# Patient Record
Sex: FEMALE | Race: WHITE | NOT HISPANIC OR LATINO | Employment: UNEMPLOYED | URBAN - METROPOLITAN AREA
[De-identification: names, ages, dates, MRNs, and addresses within clinical notes are randomized per-mention and may not be internally consistent; named-entity substitution may affect disease eponyms.]

---

## 2024-07-18 ENCOUNTER — OFFICE VISIT (OUTPATIENT)
Dept: PULMONOLOGY | Facility: CLINIC | Age: 24
End: 2024-07-18
Payer: MEDICAID

## 2024-07-18 VITALS
RESPIRATION RATE: 16 BRPM | OXYGEN SATURATION: 100 % | HEART RATE: 74 BPM | SYSTOLIC BLOOD PRESSURE: 136 MMHG | HEIGHT: 63 IN | TEMPERATURE: 97.6 F | WEIGHT: 178.6 LBS | DIASTOLIC BLOOD PRESSURE: 82 MMHG | BODY MASS INDEX: 31.64 KG/M2

## 2024-07-18 DIAGNOSIS — J30.2 SEASONAL ALLERGIES: ICD-10-CM

## 2024-07-18 DIAGNOSIS — R06.2 WHEEZING: ICD-10-CM

## 2024-07-18 DIAGNOSIS — R05.3 CHRONIC COUGH: ICD-10-CM

## 2024-07-18 DIAGNOSIS — R06.09 DOE (DYSPNEA ON EXERTION): ICD-10-CM

## 2024-07-18 DIAGNOSIS — J45.20 MILD INTERMITTENT ASTHMA WITHOUT COMPLICATION: Primary | ICD-10-CM

## 2024-07-18 LAB — EXHALED NITROUS OXIDE: 9

## 2024-07-18 RX ORDER — FLUTICASONE PROPIONATE AND SALMETEROL XINAFOATE 115; 21 UG/1; UG/1
2 AEROSOL, METERED RESPIRATORY (INHALATION)
Qty: 1 EACH | Refills: 2 | Status: SHIPPED | OUTPATIENT
Start: 2024-07-18

## 2024-07-18 RX ORDER — AMITRIPTYLINE HYDROCHLORIDE 10 MG/1
TABLET, FILM COATED ORAL
COMMUNITY
Start: 2024-03-11

## 2024-07-18 RX ORDER — LAMOTRIGINE 200 MG/1
TABLET ORAL
COMMUNITY

## 2024-07-18 RX ORDER — MONTELUKAST SODIUM 10 MG/1
10 TABLET ORAL NIGHTLY
Qty: 30 TABLET | Refills: 2 | Status: SHIPPED | OUTPATIENT
Start: 2024-07-18

## 2024-07-18 RX ORDER — METOPROLOL SUCCINATE 50 MG/1
50 TABLET, EXTENDED RELEASE ORAL DAILY
COMMUNITY
Start: 2024-06-05 | End: 2024-12-02

## 2024-07-18 RX ORDER — ERGOCALCIFEROL 1.25 MG/1
1 CAPSULE ORAL
COMMUNITY
Start: 2024-02-23

## 2024-07-18 RX ORDER — HYDROXYZINE HYDROCHLORIDE 10 MG/1
10 TABLET, FILM COATED ORAL
COMMUNITY

## 2024-07-18 RX ORDER — CETIRIZINE HYDROCHLORIDE 10 MG/1
TABLET ORAL
COMMUNITY

## 2024-07-18 RX ORDER — BETAMETHASONE DIPROPIONATE 0.5 MG/G
CREAM TOPICAL EVERY 12 HOURS SCHEDULED
COMMUNITY

## 2024-07-18 RX ORDER — ALBUTEROL SULFATE 90 UG/1
2 AEROSOL, METERED RESPIRATORY (INHALATION)
COMMUNITY
Start: 2024-05-23

## 2024-07-18 RX ORDER — FLUTICASONE PROPIONATE 50 MCG
1 SPRAY, SUSPENSION (ML) NASAL DAILY
COMMUNITY
Start: 2024-06-05 | End: 2025-06-05

## 2024-07-18 RX ORDER — PREDNISONE 20 MG/1
TABLET ORAL
COMMUNITY
Start: 2024-05-23 | End: 2024-07-18

## 2024-07-18 RX ORDER — CLOTRIMAZOLE AND BETAMETHASONE DIPROPIONATE 10; .64 MG/G; MG/G
CREAM TOPICAL
COMMUNITY
Start: 2024-06-05 | End: 2025-06-05

## 2024-07-18 RX ORDER — LAMOTRIGINE 25 MG/1
100 TABLET ORAL DAILY
COMMUNITY

## 2024-07-18 RX ORDER — RISPERIDONE 2 MG/1
TABLET ORAL DAILY
COMMUNITY

## 2024-08-09 ENCOUNTER — APPOINTMENT (OUTPATIENT)
Dept: CT IMAGING | Facility: HOSPITAL | Age: 24
End: 2024-08-09
Payer: MEDICAID

## 2024-08-09 ENCOUNTER — HOSPITAL ENCOUNTER (EMERGENCY)
Facility: HOSPITAL | Age: 24
Discharge: HOME | End: 2024-08-09
Attending: EMERGENCY MEDICINE
Payer: MEDICAID

## 2024-08-09 ENCOUNTER — APPOINTMENT (OUTPATIENT)
Dept: ULTRASOUND IMAGING | Facility: HOSPITAL | Age: 24
End: 2024-08-09
Payer: MEDICAID

## 2024-08-09 VITALS
WEIGHT: 176.59 LBS | OXYGEN SATURATION: 100 % | RESPIRATION RATE: 18 BRPM | SYSTOLIC BLOOD PRESSURE: 149 MMHG | HEART RATE: 84 BPM | BODY MASS INDEX: 31.29 KG/M2 | HEIGHT: 63 IN | DIASTOLIC BLOOD PRESSURE: 96 MMHG | TEMPERATURE: 98.3 F

## 2024-08-09 DIAGNOSIS — N83.201 RIGHT OVARIAN CYST: Primary | ICD-10-CM

## 2024-08-09 LAB
ALBUMIN SERPL-MCNC: 4.4 G/DL (ref 3.5–5.2)
ALBUMIN/GLOB SERPL: 1.6 G/DL
ALP SERPL-CCNC: 80 U/L (ref 39–117)
ALT SERPL W P-5'-P-CCNC: 9 U/L (ref 1–33)
ANION GAP SERPL CALCULATED.3IONS-SCNC: 12 MMOL/L (ref 5–15)
AST SERPL-CCNC: 12 U/L (ref 1–32)
BASOPHILS # BLD AUTO: 0.03 10*3/MM3 (ref 0–0.2)
BASOPHILS NFR BLD AUTO: 0.4 % (ref 0–1.5)
BILIRUB SERPL-MCNC: 0.3 MG/DL (ref 0–1.2)
BILIRUB UR QL STRIP: NEGATIVE
BUN SERPL-MCNC: 13 MG/DL (ref 6–20)
BUN/CREAT SERPL: 16.9 (ref 7–25)
CALCIUM SPEC-SCNC: 9.4 MG/DL (ref 8.6–10.5)
CHLORIDE SERPL-SCNC: 103 MMOL/L (ref 98–107)
CLARITY UR: CLEAR
CO2 SERPL-SCNC: 24 MMOL/L (ref 22–29)
COLOR UR: YELLOW
CREAT SERPL-MCNC: 0.77 MG/DL (ref 0.57–1)
DEPRECATED RDW RBC AUTO: 39.9 FL (ref 37–54)
EGFRCR SERPLBLD CKD-EPI 2021: 110.6 ML/MIN/1.73
EOSINOPHIL # BLD AUTO: 0.05 10*3/MM3 (ref 0–0.4)
EOSINOPHIL NFR BLD AUTO: 0.6 % (ref 0.3–6.2)
ERYTHROCYTE [DISTWIDTH] IN BLOOD BY AUTOMATED COUNT: 12.5 % (ref 12.3–15.4)
GLOBULIN UR ELPH-MCNC: 2.7 GM/DL
GLUCOSE SERPL-MCNC: 88 MG/DL (ref 65–99)
GLUCOSE UR STRIP-MCNC: NEGATIVE MG/DL
HCG INTACT+B SERPL-ACNC: <0.5 MIU/ML
HCT VFR BLD AUTO: 39.6 % (ref 34–46.6)
HGB BLD-MCNC: 13.6 G/DL (ref 12–15.9)
HGB UR QL STRIP.AUTO: NEGATIVE
HOLD SPECIMEN: NORMAL
HOLD SPECIMEN: NORMAL
IMM GRANULOCYTES # BLD AUTO: 0.01 10*3/MM3 (ref 0–0.05)
IMM GRANULOCYTES NFR BLD AUTO: 0.1 % (ref 0–0.5)
KETONES UR QL STRIP: NEGATIVE
LEUKOCYTE ESTERASE UR QL STRIP.AUTO: NEGATIVE
LIPASE SERPL-CCNC: 45 U/L (ref 13–60)
LYMPHOCYTES # BLD AUTO: 2.3 10*3/MM3 (ref 0.7–3.1)
LYMPHOCYTES NFR BLD AUTO: 28.2 % (ref 19.6–45.3)
MCH RBC QN AUTO: 30 PG (ref 26.6–33)
MCHC RBC AUTO-ENTMCNC: 34.3 G/DL (ref 31.5–35.7)
MCV RBC AUTO: 87.2 FL (ref 79–97)
MONOCYTES # BLD AUTO: 0.61 10*3/MM3 (ref 0.1–0.9)
MONOCYTES NFR BLD AUTO: 7.5 % (ref 5–12)
NEUTROPHILS NFR BLD AUTO: 5.17 10*3/MM3 (ref 1.7–7)
NEUTROPHILS NFR BLD AUTO: 63.2 % (ref 42.7–76)
NITRITE UR QL STRIP: NEGATIVE
NRBC BLD AUTO-RTO: 0 /100 WBC (ref 0–0.2)
PH UR STRIP.AUTO: 6 [PH] (ref 5–8)
PLATELET # BLD AUTO: 227 10*3/MM3 (ref 140–450)
PMV BLD AUTO: 11 FL (ref 6–12)
POTASSIUM SERPL-SCNC: 4.2 MMOL/L (ref 3.5–5.2)
PROT SERPL-MCNC: 7.1 G/DL (ref 6–8.5)
PROT UR QL STRIP: NEGATIVE
RBC # BLD AUTO: 4.54 10*6/MM3 (ref 3.77–5.28)
SODIUM SERPL-SCNC: 139 MMOL/L (ref 136–145)
SP GR UR STRIP: 1.01 (ref 1–1.03)
UROBILINOGEN UR QL STRIP: NORMAL
WBC NRBC COR # BLD AUTO: 8.17 10*3/MM3 (ref 3.4–10.8)
WHOLE BLOOD HOLD COAG: NORMAL
WHOLE BLOOD HOLD SPECIMEN: NORMAL

## 2024-08-09 PROCEDURE — 76830 TRANSVAGINAL US NON-OB: CPT

## 2024-08-09 PROCEDURE — 25510000001 IOPAMIDOL PER 1 ML: Performed by: EMERGENCY MEDICINE

## 2024-08-09 PROCEDURE — 99285 EMERGENCY DEPT VISIT HI MDM: CPT

## 2024-08-09 PROCEDURE — 85025 COMPLETE CBC W/AUTO DIFF WBC: CPT | Performed by: EMERGENCY MEDICINE

## 2024-08-09 PROCEDURE — 25010000002 KETOROLAC TROMETHAMINE PER 15 MG

## 2024-08-09 PROCEDURE — 83690 ASSAY OF LIPASE: CPT | Performed by: EMERGENCY MEDICINE

## 2024-08-09 PROCEDURE — 74177 CT ABD & PELVIS W/CONTRAST: CPT

## 2024-08-09 PROCEDURE — 80053 COMPREHEN METABOLIC PANEL: CPT | Performed by: EMERGENCY MEDICINE

## 2024-08-09 PROCEDURE — 81003 URINALYSIS AUTO W/O SCOPE: CPT | Performed by: EMERGENCY MEDICINE

## 2024-08-09 PROCEDURE — 84702 CHORIONIC GONADOTROPIN TEST: CPT | Performed by: EMERGENCY MEDICINE

## 2024-08-09 PROCEDURE — 96374 THER/PROPH/DIAG INJ IV PUSH: CPT

## 2024-08-09 RX ORDER — KETOROLAC TROMETHAMINE 10 MG/1
10 TABLET, FILM COATED ORAL EVERY 6 HOURS PRN
Qty: 15 TABLET | Refills: 0 | Status: SHIPPED | OUTPATIENT
Start: 2024-08-09

## 2024-08-09 RX ORDER — KETOROLAC TROMETHAMINE 30 MG/ML
30 INJECTION, SOLUTION INTRAMUSCULAR; INTRAVENOUS ONCE
Status: COMPLETED | OUTPATIENT
Start: 2024-08-09 | End: 2024-08-09

## 2024-08-09 RX ORDER — SODIUM CHLORIDE 0.9 % (FLUSH) 0.9 %
10 SYRINGE (ML) INJECTION AS NEEDED
Status: DISCONTINUED | OUTPATIENT
Start: 2024-08-09 | End: 2024-08-10 | Stop reason: HOSPADM

## 2024-08-09 RX ADMIN — IOPAMIDOL 100 ML: 755 INJECTION, SOLUTION INTRAVENOUS at 19:03

## 2024-08-09 RX ADMIN — KETOROLAC TROMETHAMINE 30 MG: 30 INJECTION, SOLUTION INTRAMUSCULAR; INTRAVENOUS at 18:50

## 2024-08-26 ENCOUNTER — OFFICE VISIT (OUTPATIENT)
Dept: PSYCHIATRY | Facility: CLINIC | Age: 24
End: 2024-08-26
Payer: MEDICAID

## 2024-08-26 VITALS
SYSTOLIC BLOOD PRESSURE: 133 MMHG | HEART RATE: 66 BPM | HEIGHT: 63 IN | BODY MASS INDEX: 31.29 KG/M2 | DIASTOLIC BLOOD PRESSURE: 79 MMHG | WEIGHT: 176.6 LBS

## 2024-08-26 DIAGNOSIS — F43.10 COMPLEX POSTTRAUMATIC STRESS DISORDER: Primary | ICD-10-CM

## 2024-08-26 DIAGNOSIS — F33.2 MDD (MAJOR DEPRESSIVE DISORDER), RECURRENT SEVERE, WITHOUT PSYCHOSIS: ICD-10-CM

## 2024-08-26 DIAGNOSIS — F60.3 BORDERLINE PERSONALITY DISORDER: ICD-10-CM

## 2024-08-26 PROCEDURE — 1159F MED LIST DOCD IN RCRD: CPT

## 2024-08-26 PROCEDURE — 90792 PSYCH DIAG EVAL W/MED SRVCS: CPT

## 2024-08-26 PROCEDURE — 1160F RVW MEDS BY RX/DR IN RCRD: CPT

## 2024-08-26 RX ORDER — BETAMETHASONE DIPROPIONATE 0.5 MG/G
CREAM TOPICAL
COMMUNITY
End: 2024-08-26

## 2024-08-27 ENCOUNTER — OFFICE VISIT (OUTPATIENT)
Dept: OBSTETRICS AND GYNECOLOGY | Facility: CLINIC | Age: 24
End: 2024-08-27
Payer: MEDICAID

## 2024-08-27 VITALS
WEIGHT: 172 LBS | DIASTOLIC BLOOD PRESSURE: 80 MMHG | BODY MASS INDEX: 30.48 KG/M2 | SYSTOLIC BLOOD PRESSURE: 108 MMHG | HEIGHT: 63 IN

## 2024-08-27 DIAGNOSIS — N83.201 CYST OF RIGHT OVARY: Primary | ICD-10-CM

## 2024-08-27 PROCEDURE — 99203 OFFICE O/P NEW LOW 30 MIN: CPT | Performed by: OBSTETRICS & GYNECOLOGY

## 2024-08-28 ENCOUNTER — PATIENT ROUNDING (BHMG ONLY) (OUTPATIENT)
Dept: OBSTETRICS AND GYNECOLOGY | Facility: CLINIC | Age: 24
End: 2024-08-28
Payer: MEDICAID

## 2024-08-30 ENCOUNTER — PATIENT ROUNDING (BHMG ONLY) (OUTPATIENT)
Dept: PSYCHIATRY | Facility: CLINIC | Age: 24
End: 2024-08-30
Payer: MEDICAID

## 2024-09-04 ENCOUNTER — HOSPITAL ENCOUNTER (OUTPATIENT)
Dept: RESPIRATORY THERAPY | Facility: HOSPITAL | Age: 24
Discharge: HOME | End: 2024-09-04
Payer: MEDICAID

## 2024-09-04 ENCOUNTER — LAB (OUTPATIENT)
Dept: LAB | Facility: HOSPITAL | Age: 24
End: 2024-09-04
Payer: MEDICAID

## 2024-09-04 DIAGNOSIS — J45.20 MILD INTERMITTENT ASTHMA WITHOUT COMPLICATION: ICD-10-CM

## 2024-09-04 DIAGNOSIS — R06.09 DOE (DYSPNEA ON EXERTION): ICD-10-CM

## 2024-09-04 DIAGNOSIS — J30.2 SEASONAL ALLERGIES: ICD-10-CM

## 2024-09-04 PROCEDURE — 94060 EVALUATION OF WHEEZING: CPT

## 2024-09-04 PROCEDURE — 82785 ASSAY OF IGE: CPT

## 2024-09-04 PROCEDURE — 94726 PLETHYSMOGRAPHY LUNG VOLUMES: CPT

## 2024-09-04 PROCEDURE — 36415 COLL VENOUS BLD VENIPUNCTURE: CPT

## 2024-09-04 PROCEDURE — 94729 DIFFUSING CAPACITY: CPT

## 2024-09-04 RX ORDER — ALBUTEROL SULFATE 0.83 MG/ML
2.5 SOLUTION RESPIRATORY (INHALATION) ONCE
Status: COMPLETED | OUTPATIENT
Start: 2024-09-04 | End: 2024-09-04

## 2024-09-04 RX ADMIN — ALBUTEROL SULFATE 2.5 MG: 2.5 SOLUTION RESPIRATORY (INHALATION) at 11:20

## 2024-09-07 LAB — IGE SERPL-ACNC: 5 IU/ML (ref 6–495)

## 2024-09-18 DIAGNOSIS — R06.2 WHEEZING: ICD-10-CM

## 2024-09-18 DIAGNOSIS — J45.20 MILD INTERMITTENT ASTHMA WITHOUT COMPLICATION: ICD-10-CM

## 2024-09-18 DIAGNOSIS — R06.09 DOE (DYSPNEA ON EXERTION): ICD-10-CM

## 2024-09-18 DIAGNOSIS — J30.2 SEASONAL ALLERGIES: ICD-10-CM

## 2024-09-18 DIAGNOSIS — R05.3 CHRONIC COUGH: Primary | ICD-10-CM

## 2024-09-26 ENCOUNTER — OFFICE VISIT (OUTPATIENT)
Dept: PSYCHIATRY | Facility: CLINIC | Age: 24
End: 2024-09-26
Payer: MEDICAID

## 2024-09-26 VITALS
WEIGHT: 179 LBS | HEIGHT: 63 IN | SYSTOLIC BLOOD PRESSURE: 125 MMHG | BODY MASS INDEX: 31.71 KG/M2 | HEART RATE: 83 BPM | DIASTOLIC BLOOD PRESSURE: 75 MMHG

## 2024-09-26 DIAGNOSIS — F60.3 BORDERLINE PERSONALITY DISORDER: ICD-10-CM

## 2024-09-26 DIAGNOSIS — F43.10 COMPLEX POSTTRAUMATIC STRESS DISORDER: Primary | ICD-10-CM

## 2024-09-26 DIAGNOSIS — F33.2 MDD (MAJOR DEPRESSIVE DISORDER), RECURRENT SEVERE, WITHOUT PSYCHOSIS: ICD-10-CM

## 2024-09-26 DIAGNOSIS — F90.9 ADULT ADHD: ICD-10-CM

## 2024-09-26 RX ORDER — BETAMETHASONE DIPROPIONATE 0.5 MG/G
CREAM TOPICAL EVERY 12 HOURS SCHEDULED
COMMUNITY

## 2024-09-26 RX ORDER — MUPIROCIN 20 MG/G
OINTMENT TOPICAL 3 TIMES DAILY
COMMUNITY
Start: 2024-08-27

## 2024-09-26 RX ORDER — KETOCONAZOLE 20 MG/G
CREAM TOPICAL DAILY
COMMUNITY
Start: 2024-09-12 | End: 2024-09-26

## 2024-10-01 ENCOUNTER — LAB (OUTPATIENT)
Dept: LAB | Facility: HOSPITAL | Age: 24
End: 2024-10-01
Payer: MEDICAID

## 2024-10-01 DIAGNOSIS — F90.9 ADULT ADHD: Primary | ICD-10-CM

## 2024-10-01 DIAGNOSIS — F90.9 ADULT ADHD: ICD-10-CM

## 2024-10-01 LAB
AMPHET+METHAMPHET UR QL: NEGATIVE
AMPHETAMINES UR QL: NEGATIVE
BARBITURATES UR QL SCN: NEGATIVE
BENZODIAZ UR QL SCN: NEGATIVE
BUPRENORPHINE SERPL-MCNC: NEGATIVE NG/ML
CANNABINOIDS SERPL QL: POSITIVE
COCAINE UR QL: NEGATIVE
FENTANYL UR-MCNC: NEGATIVE NG/ML
METHADONE UR QL SCN: NEGATIVE
OPIATES UR QL: NEGATIVE
OXYCODONE UR QL SCN: NEGATIVE
PCP UR QL SCN: NEGATIVE
TRICYCLICS UR QL SCN: NEGATIVE

## 2024-10-01 PROCEDURE — 80307 DRUG TEST PRSMV CHEM ANLYZR: CPT

## 2024-10-01 RX ORDER — DEXTROAMPHETAMINE SACCHARATE, AMPHETAMINE ASPARTATE, DEXTROAMPHETAMINE SULFATE AND AMPHETAMINE SULFATE 1.25; 1.25; 1.25; 1.25 MG/1; MG/1; MG/1; MG/1
5 TABLET ORAL DAILY
Qty: 30 TABLET | Refills: 0 | Status: SHIPPED | OUTPATIENT
Start: 2024-10-01 | End: 2024-10-31

## 2024-10-02 ENCOUNTER — TELEPHONE (OUTPATIENT)
Dept: PSYCHIATRY | Facility: CLINIC | Age: 24
End: 2024-10-02
Payer: MEDICAID

## 2024-10-13 DIAGNOSIS — J30.2 SEASONAL ALLERGIES: ICD-10-CM

## 2024-10-13 DIAGNOSIS — J45.20 MILD INTERMITTENT ASTHMA WITHOUT COMPLICATION: ICD-10-CM

## 2024-10-14 RX ORDER — MONTELUKAST SODIUM 10 MG/1
10 TABLET ORAL NIGHTLY
Qty: 30 TABLET | Refills: 2 | Status: SHIPPED | OUTPATIENT
Start: 2024-10-14

## 2024-10-18 DIAGNOSIS — J30.2 SEASONAL ALLERGIES: ICD-10-CM

## 2024-10-18 DIAGNOSIS — R06.2 WHEEZING: ICD-10-CM

## 2024-10-18 DIAGNOSIS — J45.20 MILD INTERMITTENT ASTHMA WITHOUT COMPLICATION: ICD-10-CM

## 2024-10-18 DIAGNOSIS — R05.3 CHRONIC COUGH: ICD-10-CM

## 2024-10-18 RX ORDER — FLUTICASONE PROPIONATE AND SALMETEROL XINAFOATE 115; 21 UG/1; UG/1
2 AEROSOL, METERED RESPIRATORY (INHALATION) 2 TIMES DAILY
Qty: 12 G | Refills: 11 | Status: SHIPPED | OUTPATIENT
Start: 2024-10-18

## 2024-10-22 DIAGNOSIS — J30.2 SEASONAL ALLERGIES: ICD-10-CM

## 2024-10-22 DIAGNOSIS — R05.3 CHRONIC COUGH: ICD-10-CM

## 2024-10-22 DIAGNOSIS — J30.9 ALLERGIC RHINITIS, UNSPECIFIED SEASONALITY, UNSPECIFIED TRIGGER: Primary | ICD-10-CM

## 2024-10-22 RX ORDER — AZELASTINE 1 MG/ML
2 SPRAY, METERED NASAL 2 TIMES DAILY
Qty: 1 EACH | Refills: 12 | Status: SHIPPED | OUTPATIENT
Start: 2024-10-22

## 2024-10-28 DIAGNOSIS — F90.9 ADULT ADHD: ICD-10-CM

## 2024-10-28 RX ORDER — DEXTROAMPHETAMINE SACCHARATE, AMPHETAMINE ASPARTATE, DEXTROAMPHETAMINE SULFATE AND AMPHETAMINE SULFATE 1.25; 1.25; 1.25; 1.25 MG/1; MG/1; MG/1; MG/1
5 TABLET ORAL DAILY
Qty: 30 TABLET | Refills: 0 | Status: SHIPPED | OUTPATIENT
Start: 2024-10-31 | End: 2024-11-30

## 2024-11-04 RX ORDER — METHACHOLINE CHLORIDE 0.1875/3ML
3 VIAL, NEBULIZER (ML) INHALATION
Status: ACTIVE | OUTPATIENT
Start: 2024-11-11 | End: 2024-11-11

## 2024-11-04 RX ORDER — ALBUTEROL SULFATE 0.83 MG/ML
2.5 SOLUTION RESPIRATORY (INHALATION) ONCE AS NEEDED
Status: DISCONTINUED | OUTPATIENT
Start: 2024-11-11 | End: 2024-11-14 | Stop reason: HOSPADM

## 2024-11-04 RX ORDER — METHACHOLINE CHLORIDE 3 MG/3 ML
3 VIAL, NEBULIZER (ML) INHALATION
Status: ACTIVE | OUTPATIENT
Start: 2024-11-11 | End: 2024-11-11

## 2024-11-04 RX ORDER — METHACHOLINE CHLORIDE 48 MG/3 ML
3 VIAL, NEBULIZER (ML) INHALATION
Status: ACTIVE | OUTPATIENT
Start: 2024-11-11 | End: 2024-11-11

## 2024-11-04 RX ORDER — METHACHOLINE CHLORIDE 0 MG/3 ML
3 VIAL, NEBULIZER (ML) INHALATION
Status: DISPENSED | OUTPATIENT
Start: 2024-11-11 | End: 2024-11-11

## 2024-11-04 RX ORDER — ALBUTEROL SULFATE 0.83 MG/ML
2.5 SOLUTION RESPIRATORY (INHALATION) ONCE
Status: DISCONTINUED | OUTPATIENT
Start: 2024-11-11 | End: 2024-11-14 | Stop reason: HOSPADM

## 2024-11-04 RX ORDER — METHACHOLINE CHLORIDE 12 MG/3 ML
3 VIAL, NEBULIZER (ML) INHALATION
Status: ACTIVE | OUTPATIENT
Start: 2024-11-11 | End: 2024-11-11

## 2024-11-04 RX ORDER — SODIUM CHLORIDE FOR INHALATION 0.9 %
3 VIAL, NEBULIZER (ML) INHALATION ONCE AS NEEDED
Status: DISCONTINUED | OUTPATIENT
Start: 2024-11-11 | End: 2024-11-14 | Stop reason: HOSPADM

## 2024-11-04 RX ORDER — METHACHOLINE CHLORIDE 0.75/3ML
3 VIAL, NEBULIZER (ML) INHALATION
Status: ACTIVE | OUTPATIENT
Start: 2024-11-11 | End: 2024-11-11

## 2024-11-11 ENCOUNTER — HOSPITAL ENCOUNTER (OUTPATIENT)
Dept: RESPIRATORY THERAPY | Facility: HOSPITAL | Age: 24
Discharge: HOME | End: 2024-11-11
Payer: MEDICAID

## 2024-11-14 ENCOUNTER — OFFICE VISIT (OUTPATIENT)
Dept: PSYCHIATRY | Facility: CLINIC | Age: 24
End: 2024-11-14
Payer: MEDICAID

## 2024-11-14 VITALS
SYSTOLIC BLOOD PRESSURE: 118 MMHG | OXYGEN SATURATION: 98 % | BODY MASS INDEX: 30.41 KG/M2 | HEART RATE: 88 BPM | WEIGHT: 171.6 LBS | HEIGHT: 63 IN | DIASTOLIC BLOOD PRESSURE: 71 MMHG

## 2024-11-14 DIAGNOSIS — F90.9 ADULT ADHD: Primary | ICD-10-CM

## 2024-11-14 DIAGNOSIS — F43.10 COMPLEX POSTTRAUMATIC STRESS DISORDER: ICD-10-CM

## 2024-11-14 DIAGNOSIS — F33.2 MDD (MAJOR DEPRESSIVE DISORDER), RECURRENT SEVERE, WITHOUT PSYCHOSIS: ICD-10-CM

## 2024-11-14 DIAGNOSIS — F60.3 BORDERLINE PERSONALITY DISORDER: ICD-10-CM

## 2024-11-14 RX ORDER — KETOCONAZOLE 20 MG/G
CREAM TOPICAL
COMMUNITY
Start: 2024-10-01

## 2024-11-14 RX ORDER — DEXTROAMPHETAMINE SACCHARATE, AMPHETAMINE ASPARTATE, DEXTROAMPHETAMINE SULFATE AND AMPHETAMINE SULFATE 1.25; 1.25; 1.25; 1.25 MG/1; MG/1; MG/1; MG/1
5 TABLET ORAL
Qty: 30 TABLET | Refills: 0 | Status: SHIPPED | OUTPATIENT
Start: 2024-11-14 | End: 2024-12-14

## 2024-11-14 RX ORDER — DEXTROAMPHETAMINE SACCHARATE, AMPHETAMINE ASPARTATE, DEXTROAMPHETAMINE SULFATE AND AMPHETAMINE SULFATE 1.25; 1.25; 1.25; 1.25 MG/1; MG/1; MG/1; MG/1
5 TABLET ORAL
Qty: 30 TABLET | Refills: 0 | Status: SHIPPED | OUTPATIENT
Start: 2024-11-14 | End: 2024-11-14

## 2024-11-14 RX ORDER — IBUPROFEN 600 MG/1
TABLET, FILM COATED ORAL
COMMUNITY
Start: 2024-10-01
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
## 2024-11-15 ENCOUNTER — HOSPITAL ENCOUNTER (OUTPATIENT)
Dept: RESPIRATORY THERAPY | Facility: HOSPITAL | Age: 24
Discharge: HOME | End: 2024-11-15
Payer: MEDICAID

## 2024-11-20 RX ORDER — ALBUTEROL SULFATE 0.83 MG/ML
2.5 SOLUTION RESPIRATORY (INHALATION) ONCE
Status: COMPLETED | OUTPATIENT
Start: 2024-11-25 | End: 2024-11-25

## 2024-11-20 RX ORDER — ALBUTEROL SULFATE 0.83 MG/ML
2.5 SOLUTION RESPIRATORY (INHALATION) ONCE AS NEEDED
Status: DISCONTINUED | OUTPATIENT
Start: 2024-11-25 | End: 2024-11-26 | Stop reason: HOSPADM

## 2024-11-20 RX ORDER — SODIUM CHLORIDE FOR INHALATION 0.9 %
3 VIAL, NEBULIZER (ML) INHALATION ONCE AS NEEDED
Status: COMPLETED | OUTPATIENT
Start: 2024-11-25 | End: 2024-11-25

## 2024-11-20 RX ORDER — METHACHOLINE CHLORIDE 3 MG/3 ML
3 VIAL, NEBULIZER (ML) INHALATION
Status: COMPLETED | OUTPATIENT
Start: 2024-11-25 | End: 2024-11-25

## 2024-11-20 RX ORDER — METHACHOLINE CHLORIDE 48 MG/3 ML
3 VIAL, NEBULIZER (ML) INHALATION
Status: COMPLETED | OUTPATIENT
Start: 2024-11-25 | End: 2024-11-25

## 2024-11-20 RX ORDER — METHACHOLINE CHLORIDE 12 MG/3 ML
3 VIAL, NEBULIZER (ML) INHALATION
Status: COMPLETED | OUTPATIENT
Start: 2024-11-25 | End: 2024-11-25

## 2024-11-20 RX ORDER — METHACHOLINE CHLORIDE 0.1875/3ML
3 VIAL, NEBULIZER (ML) INHALATION
Status: COMPLETED | OUTPATIENT
Start: 2024-11-25 | End: 2024-11-25

## 2024-11-20 RX ORDER — METHACHOLINE CHLORIDE 0.75/3ML
3 VIAL, NEBULIZER (ML) INHALATION
Status: COMPLETED | OUTPATIENT
Start: 2024-11-25 | End: 2024-11-25

## 2024-11-20 RX ORDER — METHACHOLINE CHLORIDE 0 MG/3 ML
3 VIAL, NEBULIZER (ML) INHALATION
Status: DISPENSED | OUTPATIENT
Start: 2024-11-25 | End: 2024-11-25

## 2024-11-21 ENCOUNTER — TELEPHONE (OUTPATIENT)
Dept: PSYCHIATRY | Facility: CLINIC | Age: 24
End: 2024-11-21
Payer: MEDICAID

## 2024-11-21 NOTE — TELEPHONE ENCOUNTER
[facility] to [facility] on [facility]24, called [facility] to follow up on referral order, spoke with linda states that they left message for patient to contact their office to schedule, states patient never returned phone calls, closing out referral order

## 2024-11-25 ENCOUNTER — HOSPITAL ENCOUNTER (OUTPATIENT)
Dept: RESPIRATORY THERAPY | Facility: HOSPITAL | Age: 24
Discharge: HOME | End: 2024-11-25
Admitting: NURSE PRACTITIONER
Payer: MEDICAID

## 2024-11-25 DIAGNOSIS — J30.2 SEASONAL ALLERGIES: ICD-10-CM

## 2024-11-25 DIAGNOSIS — F90.9 ADULT ADHD: ICD-10-CM

## 2024-11-25 DIAGNOSIS — R06.2 WHEEZING: ICD-10-CM

## 2024-11-25 DIAGNOSIS — R06.09 DOE (DYSPNEA ON EXERTION): ICD-10-CM

## 2024-11-25 DIAGNOSIS — J45.20 MILD INTERMITTENT ASTHMA WITHOUT COMPLICATION: ICD-10-CM

## 2024-11-25 DIAGNOSIS — R05.3 CHRONIC COUGH: ICD-10-CM

## 2024-11-25 PROCEDURE — 94070 EVALUATION OF WHEEZING: CPT

## 2024-11-25 RX ORDER — DEXTROAMPHETAMINE SACCHARATE, AMPHETAMINE ASPARTATE, DEXTROAMPHETAMINE SULFATE AND AMPHETAMINE SULFATE 1.25; 1.25; 1.25; 1.25 MG/1; MG/1; MG/1; MG/1
5 TABLET ORAL
Qty: 30 TABLET | Refills: 0 | Status: SHIPPED | OUTPATIENT
Start: 2024-11-27 | End: 2024-11-27 | Stop reason: SDUPTHER

## 2024-11-25 RX ADMIN — Medication 0.75 MG: at 14:00

## 2024-11-25 RX ADMIN — Medication 3 MG: at 14:05

## 2024-11-25 RX ADMIN — Medication 48 MG: at 14:15

## 2024-11-25 RX ADMIN — ALBUTEROL SULFATE 2.5 MG: 2.5 SOLUTION RESPIRATORY (INHALATION) at 14:20

## 2024-11-25 RX ADMIN — Medication 0.19 MG: at 13:55

## 2024-11-25 RX ADMIN — Medication 3 ML: at 13:50

## 2024-11-25 RX ADMIN — Medication 12 MG: at 14:10

## 2024-11-25 NOTE — TELEPHONE ENCOUNTER
PT(PATIENT) VERIFIED     PT(PATIENT) STATES SHE WAS SUPPOSED TO RECEIVE AN INCREASED DOSAGE OF amphetamine-dextroamphetamine (Adderall) 5 MG tablet (2024) BUT [facility] DOESN'T HAVE THE ORDER     PLEASE ADVISE

## 2024-11-27 RX ORDER — DEXTROAMPHETAMINE SACCHARATE, AMPHETAMINE ASPARTATE, DEXTROAMPHETAMINE SULFATE AND AMPHETAMINE SULFATE 1.25; 1.25; 1.25; 1.25 MG/1; MG/1; MG/1; MG/1
5 TABLET ORAL 2 TIMES DAILY
Qty: 60 TABLET | Refills: 0 | Status: SHIPPED | OUTPATIENT
Start: 2024-11-27

## 2024-11-27 NOTE — TELEPHONE ENCOUNTER
"PT PHONED IN ON CALL.    PT REPORTS THAT HER ADDERALL PRESCRIPTION IS SUPPOSED TO BE FOR 5MG TAKE TWICE DAILY.     PER PTS LAST OFFICE VISIT:  Office Visit with Ritu Cleaning, APRN (11/14/2024)   \"Medication regimen: Add IR Adderall 5 mg po q afternoon as booster dose, Continue Adderall IR in the AM;\"     ROUTING TO COVERING PROVIDER.  "

## 2024-12-20 ENCOUNTER — TELEPHONE (OUTPATIENT)
Dept: PULMONOLOGY | Facility: CLINIC | Age: 24
End: 2024-12-20
Payer: MEDICAID

## 2024-12-20 DIAGNOSIS — B37.0 THRUSH: Primary | ICD-10-CM

## 2024-12-20 RX ORDER — NYSTATIN 100000 [USP'U]/ML
500000 SUSPENSION ORAL 4 TIMES DAILY
Qty: 473 ML | Refills: 0 | Status: SHIPPED | OUTPATIENT
Start: 2024-12-20

## 2024-12-20 NOTE — TELEPHONE ENCOUNTER
PATIENT CALLED AND HAS THRUSH AND SHE WOULD REALLY LIKE TO SEE IF YOU WOULD SEND IN A SCRIPT FOR HER.    PLEASE CALL PATIENT AND ADVISE.

## 2024-12-27 DIAGNOSIS — F90.9 ADULT ADHD: ICD-10-CM

## 2024-12-27 RX ORDER — DEXTROAMPHETAMINE SACCHARATE, AMPHETAMINE ASPARTATE, DEXTROAMPHETAMINE SULFATE AND AMPHETAMINE SULFATE 1.25; 1.25; 1.25; 1.25 MG/1; MG/1; MG/1; MG/1
5 TABLET ORAL 2 TIMES DAILY
Qty: 60 TABLET | Refills: 0 | Status: SHIPPED | OUTPATIENT
Start: 2024-12-27

## 2024-12-31 ENCOUNTER — OFFICE VISIT (OUTPATIENT)
Dept: PULMONOLOGY | Facility: CLINIC | Age: 24
End: 2024-12-31
Payer: MEDICAID

## 2024-12-31 VITALS
OXYGEN SATURATION: 98 % | TEMPERATURE: 98.9 F | DIASTOLIC BLOOD PRESSURE: 84 MMHG | BODY MASS INDEX: 29.06 KG/M2 | HEART RATE: 82 BPM | HEIGHT: 63 IN | RESPIRATION RATE: 18 BRPM | SYSTOLIC BLOOD PRESSURE: 126 MMHG | WEIGHT: 164 LBS

## 2024-12-31 DIAGNOSIS — B37.0 THRUSH: ICD-10-CM

## 2024-12-31 DIAGNOSIS — R07.89 CHEST PAIN, ATYPICAL: ICD-10-CM

## 2024-12-31 DIAGNOSIS — J30.2 SEASONAL ALLERGIES: ICD-10-CM

## 2024-12-31 DIAGNOSIS — R06.09 DYSPNEA ON EXERTION: Primary | ICD-10-CM

## 2024-12-31 RX ORDER — FLUCONAZOLE 100 MG/1
100 TABLET ORAL DAILY
Qty: 7 TABLET | Refills: 0 | Status: SHIPPED | OUTPATIENT
Start: 2024-12-31 | End: 2025-01-07

## 2024-12-31 RX ORDER — MONTELUKAST SODIUM 10 MG/1
10 TABLET ORAL NIGHTLY
Qty: 30 TABLET | Refills: 11 | Status: SHIPPED | OUTPATIENT
Start: 2024-12-31

## 2024-12-31 NOTE — PROGRESS NOTES
Primary Care Provider  Ellen Black, FRANCO     Referring Provider  No ref. provider found     Chief Complaint  Asthma, Follow-up (5 Month f/u ), Cough, Shortness of Breath (With heat and humidity ), and Results (Bronchial challenge and PFT )    Subjective          Evelyn Rashid presents to [facility]MON[facility]INE  History of Present Illness  Evelyn Rashid is a 24 y.o. female patient here for management of shortness of breath.    Patient had a methacholine challenge test which was negative for asthma.  She does complain of shortness of breath especially with heat and humidity.  Patient states that showers are difficult for her.  She did discontinue the Advair due to thrush and did not notice a difference after discontinuing this medication.  She has used her albuterol for coughing spells.  She continues to take Singulair at night.  She does describe a discomfort in the center of her chest to her back along with a grinding sensation.  Patient has not had a CT scan of her chest since 2019.  Overall, she is that she is doing okay and has no additional concerns at this time.     Her history of smoking is   Tobacco Use: Low Risk  (12/31/2024)    Patient History     Smoking Tobacco Use: Never     Smokeless Tobacco Use: Never     Passive Exposure: Past   .    Review of Systems   Constitutional:  Negative for chills, fatigue, fever, unexpected weight gain and unexpected weight loss.   HENT:  Congestion: Nasal.    Respiratory:  Positive for cough and shortness of breath. Negative for apnea and wheezing.         Negative for Hemoptysis     Cardiovascular:  Negative for chest pain, palpitations and leg swelling.   Skin:         Negative for cyanosis      Sleep: Negative for Excessive daytime sleepiness  Negative for morning headaches  Negative for Snoring    Family History   Problem Relation Age of Onset    Hypertension Father     ADD / ADHD Father         Unsure if  diagnosed - dont think so - talks about it briefly and how it effects him but i dont think he pursues his mental health since he has been able to become highly successful anyways    OCD Father         Unsure if diagnosed - talked about    Breast cancer - additional onset Mother         ERROR    Depression Mother     Drug abuse Mother         Addiction to benzos, opioids, barbiturates, cocaine    Paranoid behavior Mother         Delusional Parasytosis    Suicide Attempts Mother         Unsure - her death was originally pitched to me as murder, then notated as accidental, then later revealed to possibly be suicide    No Known Problems Brother     No Known Problems Sister     Hypertension Paternal Grandfather          of heart attack    Heart attack Paternal Grandfather     Dementia Paternal Grandmother     Anxiety disorder Maternal Grandmother     OCD Maternal Grandmother     Osteoporosis Maternal Grandmother     Diabetes Maternal Grandfather     Alcohol abuse Maternal Aunt     Bipolar disorder Maternal Aunt     No Known Problems Maternal Uncle     No Known Problems Paternal Aunt     No Known Problems Paternal Uncle     No Known Problems Cousin     No Known Problems Other     Schizophrenia Neg Hx     Seizures Neg Hx     Self-Injurious Behavior  Neg Hx     Breast cancer Neg Hx     Uterine cancer Neg Hx     Ovarian cancer Neg Hx     Colon cancer Neg Hx         Social History     Socioeconomic History    Marital status: Single   Tobacco Use    Smoking status: Never     Passive exposure: Past    Smokeless tobacco: Never    Tobacco comments:     Used very limitedly for like vape 2 months at most and was barely using it - never touched again   Vaping Use    Vaping status: Former    Start date: 2024    Quit date: 2024    Substances: Nicotine, Flavoring    Devices: Disposable   Substance and Sexual Activity    Alcohol use: Not Currently     Comment: I dont drink at all unless socially and it’d be just a glass     Drug use: Yes     Frequency: 7.0 times per week     Types: Marijuana     Comment: Use medically, keep incredibly informed, have my certificate    Sexual activity: Not Currently     Partners: Male     Birth control/protection: Condom     Comment: Long term partner        Past Medical History:   Diagnosis Date    ADD (attention deficit disorder)     Bipolar disorder 2019    This diagnosis is no longer representative of my symptoms and was certainly a misdiagnosis but for records sake, you should know i had been misdiagnosed and medicated for bipolar for a few years    Borderline personality disorder 2021    Symptoms existed prior to a crisis - post crisis there was a severe worsening of behaviors to the point my personality and how i react is different from before that relationship ended    Chronic pain disorder 2012    Fibromyalgia and prob more undiagnosed    Chronic post-traumatic stress disorder (PTSD)     Depression Unsure - 2012    Persistent feelings of hopelessness among other negative feelings present since 2012 age 11    Fibromyalgia     Head injury Childhood    Had head stapled after cracking it in the shower w my mom as a toddler/baby. Hit my head frequently in my youth without ever looking into those occurences other than the initial head trauma    Hypertension     IBS (irritable bowel syndrome)     Migraine     Ovarian cyst     Panic disorder 2012    This was thrown in once by someone without further looking into it. I am currently struggling w either panic or anxiety attacks but i predominantly have meltdowns and the distinction hasnt been made yet by my providers    Personality disorder     PMS (premenstrual syndrome)     Self-injurious behavior 2016    Previous meltdowns have before involved wanting to self soothe with self harm or using it as a tool to calm down during a crisis and have control. The main trigger of the PSTD was in [id number] and was when the self harm became real    Suicide attempt  2017    Trauma     Urinary tract infection         Immunization History   Administered Date(s) Administered    DTaP 01/03/2000, 03/09/2000, 05/19/2000, 06/01/2001, 02/21/2005    Fluzone (or Fluarix & Flulaval for VFC) >6mos 11/09/2018, 12/13/2022    H1N1 All Forms 11/11/2009    HPV Quadrivalent 05/25/2011, 07/27/2011, 01/05/2012    Hep A, Unspecified 07/13/2006, 07/13/2007    Hep B, Unspecified 1999, 1999, 09/07/2000    HiB 01/03/2000, 03/09/2000, 05/19/2000, 06/01/2001    IPV 01/03/2000, 03/09/2000, 06/01/2001, 02/21/2005    Influenza Seasonal Injectable 10/16/2013, 09/17/2014    MCV4 Unspecified 01/05/2012    MMR 01/10/2001, 02/21/2005    Meningococcal Conjugate 05/17/2017    PEDS-Pneumococcal Conjugate (PCV7) 05/19/2000, 08/02/2000, 10/10/2000, 06/01/2001    Tdap 05/25/2011    Varicella 01/10/2001, 07/13/2007         Allergies   Allergen Reactions    Sulfamethoxazole-Trimethoprim Anaphylaxis, Other (See Comments) and Shortness Of Breath     And delerious    Aspergillus Species Other (See Comments)     PT REPORTS THAT SHE WAS INFORMED BY ALLERGIST     [place] Other (See Comments)     PT REPORTS THAT SHE WAS INFORMED BY ALLERGIST     Cat Hair Extract Other (See Comments)     PT REPORTS THAT SHE WAS INFORMED BY ALLERGIST     Dog Fennel Other (See Comments)     PT REPORTS THAT SHE WAS INFORMED BY ALLERGIST     Dust Mite Extract Other (See Comments)     PT REPORTS THAT SHE WAS INFORMED BY ALLERGIST     [place] Other (See Comments)     PT REPORTS THAT SHE WAS INFORMED BY ALLERGIST       [place] Pollen Extract [Gramineae Pollens] Other (See Comments)     PT REPORTS THAT SHE WAS INFORMED BY ALLERGIST     Mixed Ragweed Other (See Comments)     PT REPORTS THAT SHE WAS INFORMED BY ALLERGIST     [place] Other (See Comments)     PT REPORTS THAT SHE WAS INFORMED BY ALLERGIST     Adhesive Tape Dermatitis and Hives     Skin peals off, blisters    Oak Bark [Quercus Robur] Rash     PT REPORTS  THAT SHE WAS INFORMED BY ALLERGIST           Current Outpatient Medications:     albuterol sulfate  (90 Base) MCG/ACT inhaler, Inhale 2 puffs., Disp: , Rfl:     amphetamine-dextroamphetamine (Adderall) 5 MG tablet, Take 1 tablet by mouth 2 (Two) Times a Day., Disp: 60 tablet, Rfl: 0    azelastine (ASTELIN) 0.1 % nasal spray, Administer 2 sprays into the nostril(s) as directed by provider 2 (Two) Times a Day. Use in each nostril as directed, Disp: 1 each, Rfl: 12    cetirizine (ZyrTEC Allergy) 10 MG tablet, , Disp: , Rfl:     fluticasone (FLONASE) 50 MCG/ACT nasal spray, Administer 1 spray into the nostril(s) as directed by provider Daily., Disp: , Rfl:     metoprolol succinate XL (TOPROL-XL) 50 MG 24 hr tablet, Take 1 tablet by mouth Daily., Disp: , Rfl:     montelukast (SINGULAIR) 10 MG tablet, Take 1 tablet by mouth Every Night., Disp: 30 tablet, Rfl: 11    nystatin (MYCOSTATIN) 100,000 unit/mL suspension, Take 5 mL by mouth 4 (Four) Times a Day., Disp: 473 mL, Rfl: 0    fluconazole (DIFLUCAN) 100 MG tablet, Take 1 tablet by mouth Daily for 7 days., Disp: 7 tablet, Rfl: 0    ibuprofen (ADVIL,MOTRIN) 600 MG tablet, TAKE 1 TABLET BY MOUTH EVERY 8 HOURS FOR UP TO 10 DAYS AS NEEDED FOR PAIN (Patient not taking: Reported on 12/31/2024), Disp: , Rfl:     ketoconazole (NIZORAL) 2 % cream, APPLY TOPICALLY TO AFFECTED AREA TWICE DAILY FOR 14 DAYS (Patient not taking: Reported on 12/31/2024), Disp: , Rfl:     ketorolac (TORADOL) 10 MG tablet, Take 1 tablet by mouth Every 6 (Six) Hours As Needed for Moderate Pain. (Patient not taking: Reported on 12/31/2024), Disp: 15 tablet, Rfl: 0    mupirocin (BACTROBAN) 2 % ointment, Apply  topically to the appropriate area as directed 3 (Three) Times a Day. apply topically to the affected area three times daily (Patient not taking: Reported on 12/31/2024), Disp: , Rfl:      Objective   Physical Exam  Constitutional:       General: She is not in acute distress.     Appearance:  "Normal appearance. She is normal weight.   HENT:      Right Ear: Hearing normal.      Left Ear: Hearing normal.      Nose: No nasal tenderness or congestion.      Mouth/Throat:      Mouth: Mucous membranes are moist. No oral lesions.   Eyes:      Extraocular Movements: Extraocular movements intact.      Pupils: Pupils are equal, round, and reactive to light.   Cardiovascular:      Rate and Rhythm: Normal rate and regular rhythm.      Pulses: Normal pulses.      Heart sounds: Normal heart sounds. No murmur heard.  Pulmonary:      Effort: Pulmonary effort is normal.      Breath sounds: Normal breath sounds. No wheezing, rhonchi or rales.   Musculoskeletal:      Right lower leg: No edema.      Left lower leg: No edema.   Skin:     General: Skin is warm and dry.      Findings: No lesion or rash.   Neurological:      General: No focal deficit present.      Mental Status: She is alert and oriented to person, place, and time.   Psychiatric:         Mood and Affect: Affect normal. Mood is not anxious or depressed.         Vital Signs:   /84 (BP Location: Left arm, Patient Position: Sitting, Cuff Size: Adult)   Pulse 82   Temp 98.9 °F (37.2 °C) (Temporal)   Resp 18   Ht 160 cm (62.99\")   Wt 74.4 kg (164 lb)   SpO2 98% Comment: Room air.  BMI 29.06 kg/m²        Result Review :   The following data was reviewed by: FRANCO Anaya on 12/31/2024:  CMP          8/9/2024    18:28   CMP   Glucose 88    BUN 13    Creatinine 0.77    EGFR 110.6    Sodium 139    Potassium 4.2    Chloride 103    Calcium 9.4    Total Protein 7.1    Albumin 4.4    Globulin 2.7    Total Bilirubin 0.3    Alkaline Phosphatase 80    AST (SGOT) 12    ALT (SGPT) 9    Albumin/Globulin Ratio 1.6    BUN/Creatinine Ratio 16.9    Anion Gap 12.0      CBC w/diff          6/5/2024    15:05 8/9/2024    18:28   CBC w/Diff   WBC 8.26     8.17    RBC 4.71     4.54    Hemoglobin 14.2     13.6    Hematocrit 41.5     39.6    MCV 88.1     87.2    MCH 30.1    "  30.0    MCHC 34.2     34.3    RDW 12.3     12.5    Platelets 237     227    Neutrophil Rel % 59.6     63.2    Immature Granulocyte Rel % 0.1     0.1    Lymphocyte Rel % 33.1     28.2    Monocyte Rel % 6.5     7.5    Eosinophil Rel % 0.5     0.6    Basophil Rel % 0.2     0.4       Details          This result is from an external source.             Data reviewed : Radiologic studies chest x-ray 5/8/2023, pulmonary function test 9/4/2024, methacholine challenge test 11/25/2024 and my last office note    Procedures        Assessment and Plan    Diagnoses and all orders for this visit:    1. Dyspnea on exertion (Primary)  Comments:  Continue albuterol as needed  Orders:  -     CT Chest Without Contrast; Future    2. Thrush  -     fluconazole (DIFLUCAN) 100 MG tablet; Take 1 tablet by mouth Daily for 7 days.  Dispense: 7 tablet; Refill: 0    3. Chest pain, atypical  -     CT Chest Without Contrast; Future    4. Seasonal allergies  Comments:  continue Zyrtec and Flonase. Start Singulair  Orders:  -     montelukast (SINGULAIR) 10 MG tablet; Take 1 tablet by mouth Every Night.  Dispense: 30 tablet; Refill: 11          Follow Up   Return in about 1 year (around 12/31/2025) for Recheck.  Patient was given instructions and counseling regarding her condition or for health maintenance advice. Please see specific information pulled into the AVS if appropriate.

## 2025-01-01 DIAGNOSIS — J30.2 SEASONAL ALLERGIES: ICD-10-CM

## 2025-01-02 RX ORDER — MONTELUKAST SODIUM 10 MG/1
10 TABLET ORAL NIGHTLY
Qty: 30 TABLET | Refills: 11 | OUTPATIENT
Start: 2025-01-02

## 2025-02-07 DIAGNOSIS — F90.9 ADULT ADHD: ICD-10-CM

## 2025-02-07 RX ORDER — DEXTROAMPHETAMINE SACCHARATE, AMPHETAMINE ASPARTATE, DEXTROAMPHETAMINE SULFATE AND AMPHETAMINE SULFATE 1.25; 1.25; 1.25; 1.25 MG/1; MG/1; MG/1; MG/1
5 TABLET ORAL 2 TIMES DAILY
Qty: 60 TABLET | Refills: 0 | Status: SHIPPED | OUTPATIENT
Start: 2025-02-07 | End: 2025-03-09

## 2025-02-07 NOTE — TELEPHONE ENCOUNTER
CONTROLLED MEDICATION REFILL REQUEST    STATE REGULATION APPT EVERY 3 MONTHS     UDS(URINE DRUG SCREEN) EVERY 6 MONTHS OR UP TO PROVIDER PREFERENCE   (AMAN BRYAN & VIOLET 1 PER YEAR)     NEW NARC CONSENT EVERY YEAR      MEDICATION: amphetamine-dextroamphetamine (Adderall) 5 MG tablet (12/27/2024)     PT(PATIENT) CONFIRMED PHARMACY: [facility]      NEXT OFFICE VISIT: Appointment with Ritu Cleaning APRN (02/14/2025)     LAST OFFICE VISIT: Office Visit with Ritu Cleaning APRN (11/14/2024)     NARC CONSENT: CONTROLLED SUBSTANCE AGREEMENT - SCAN - CONTROLLED SUSBTANCE AGREEMENT, BEHAVIORAL HEALTH, 09/26/2024 (09/26/2024)     URINE DRUG SCREEN(STANDING ORDER)   (AMAN BRYAN & VIOLET 1 PER YEAR): Fentanyl, Urine - Urine, Clean Catch (10/01/2024 09:44)  Urine Drug Screen - Urine, Clean Catch (10/01/2024 09:44)    PROVIDER PLEASE ADVISE

## 2025-02-14 ENCOUNTER — OFFICE VISIT (OUTPATIENT)
Dept: PSYCHIATRY | Facility: CLINIC | Age: 25
End: 2025-02-14
Payer: MEDICAID

## 2025-02-14 VITALS
HEIGHT: 63 IN | WEIGHT: 164.2 LBS | BODY MASS INDEX: 29.09 KG/M2 | HEART RATE: 71 BPM | SYSTOLIC BLOOD PRESSURE: 122 MMHG | DIASTOLIC BLOOD PRESSURE: 78 MMHG

## 2025-02-14 DIAGNOSIS — F60.3 BORDERLINE PERSONALITY DISORDER: ICD-10-CM

## 2025-02-14 DIAGNOSIS — F33.1 MODERATE EPISODE OF RECURRENT MAJOR DEPRESSIVE DISORDER: ICD-10-CM

## 2025-02-14 DIAGNOSIS — F43.10 COMPLEX POSTTRAUMATIC STRESS DISORDER: ICD-10-CM

## 2025-02-14 DIAGNOSIS — F90.9 ADULT ADHD: Primary | ICD-10-CM

## 2025-02-14 RX ORDER — PRENATAL VIT 91/IRON/FOLIC/DHA 28-975-200
COMBINATION PACKAGE (EA) ORAL
COMMUNITY
Start: 2025-01-07 | End: 2025-02-14

## 2025-02-14 RX ORDER — CHLORHEXIDINE GLUCONATE ORAL RINSE 1.2 MG/ML
SOLUTION DENTAL
COMMUNITY
Start: 2025-01-15 | End: 2025-02-14

## 2025-02-14 RX ORDER — HYDROCODONE BITARTRATE AND ACETAMINOPHEN 5; 325 MG/1; MG/1
TABLET ORAL
COMMUNITY
Start: 2025-01-15 | End: 2025-02-14

## 2025-02-14 RX ORDER — AMOXICILLIN 875 MG/1
1 TABLET, COATED ORAL EVERY 12 HOURS SCHEDULED
COMMUNITY
Start: 2025-01-18 | End: 2025-02-14
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
## 2025-03-17 ENCOUNTER — TELEPHONE (OUTPATIENT)
Dept: PSYCHIATRY | Facility: CLINIC | Age: 25
End: 2025-03-17
Payer: MEDICAID

## 2025-03-17 DIAGNOSIS — F90.9 ADULT ADHD: Primary | ICD-10-CM

## 2025-03-17 RX ORDER — DEXTROAMPHETAMINE SACCHARATE, AMPHETAMINE ASPARTATE MONOHYDRATE, DEXTROAMPHETAMINE SULFATE AND AMPHETAMINE SULFATE 3.75; 3.75; 3.75; 3.75 MG/1; MG/1; MG/1; MG/1
15 CAPSULE, EXTENDED RELEASE ORAL DAILY
Qty: 30 CAPSULE | Refills: 0 | Status: SHIPPED | OUTPATIENT
Start: 2025-03-17 | End: 2026-03-17

## 2025-03-17 NOTE — TELEPHONE ENCOUNTER
Pt is calling in regards to Adderall (immediate release) 10mg daily. She would like know if the dosage is getting moved up to 15mg or if it ws getting changed to 15 mg extended release.      Pt would also like to know about refills for a controlled substance while traveling out of [facility]. Pt is traveling roughly April 21- April 27 which fall around refill time.

## 2025-03-17 NOTE — TELEPHONE ENCOUNTER
Pt called back in regards to Adderall she did follow up with Cardiology and as of now she is still good to take Adderall. She would like to go ahead and up to 15 mg and understand the side effects.     Regarding refills I advised patient per note that we can try to fill earlier and advised about pharmacist not honoring.

## 2025-04-14 ENCOUNTER — OFFICE VISIT (OUTPATIENT)
Dept: PSYCHIATRY | Facility: CLINIC | Age: 25
End: 2025-04-14
Payer: MEDICAID

## 2025-04-14 VITALS
DIASTOLIC BLOOD PRESSURE: 72 MMHG | HEIGHT: 63 IN | BODY MASS INDEX: 27.89 KG/M2 | WEIGHT: 157.4 LBS | SYSTOLIC BLOOD PRESSURE: 121 MMHG | HEART RATE: 81 BPM

## 2025-04-14 DIAGNOSIS — F33.1 MODERATE EPISODE OF RECURRENT MAJOR DEPRESSIVE DISORDER: ICD-10-CM

## 2025-04-14 DIAGNOSIS — F90.9 ADULT ADHD: Primary | ICD-10-CM

## 2025-04-14 DIAGNOSIS — F43.10 COMPLEX POSTTRAUMATIC STRESS DISORDER: ICD-10-CM

## 2025-04-14 DIAGNOSIS — F60.3 BORDERLINE PERSONALITY DISORDER: ICD-10-CM

## 2025-04-14 PROCEDURE — 1159F MED LIST DOCD IN RCRD: CPT

## 2025-04-14 PROCEDURE — 99214 OFFICE O/P EST MOD 30 MIN: CPT

## 2025-04-14 PROCEDURE — 1160F RVW MEDS BY RX/DR IN RCRD: CPT

## 2025-04-14 RX ORDER — NYSTATIN 100000 [USP'U]/ML
200000 SUSPENSION ORAL 4 TIMES DAILY
COMMUNITY
Start: 2025-03-03

## 2025-04-14 RX ORDER — AZELASTINE 1 MG/ML
SPRAY, METERED NASAL
COMMUNITY
Start: 2025-03-25
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
## 2025-05-06 ENCOUNTER — TELEPHONE (OUTPATIENT)
Dept: PSYCHIATRY | Facility: CLINIC | Age: 25
End: 2025-05-06
Payer: MEDICAID

## 2025-05-06 DIAGNOSIS — F90.9 ADULT ADHD: Primary | ICD-10-CM

## 2025-05-06 RX ORDER — DEXTROAMPHETAMINE SACCHARATE, AMPHETAMINE ASPARTATE MONOHYDRATE, DEXTROAMPHETAMINE SULFATE AND AMPHETAMINE SULFATE 5; 5; 5; 5 MG/1; MG/1; MG/1; MG/1
20 CAPSULE, EXTENDED RELEASE ORAL DAILY
Qty: 30 CAPSULE | Refills: 0 | Status: SHIPPED | OUTPATIENT
Start: 2025-05-06 | End: 2025-06-05

## 2025-05-06 NOTE — TELEPHONE ENCOUNTER
REFILL REQUEST:     amphetamine-dextroamphetamine XR (Adderall XR) 15 MG 24 hr capsule (03/17/2025)     F/UP- 05/20/2025.  LOV: 04/14/2025.    LAST UDS:  Urine Drug Screen - Urine, Clean Catch (10/01/2024 09:44)     PT STATES THAT FOR THIS REFILL IT WAS DISCUSSED INCREASING THE DOSAGE.     Office Visit with Ritu Cleaning APRN (04/14/2025)       PT REQUEST A FOLLOW UP CALL.
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
## 2025-05-20 ENCOUNTER — OFFICE VISIT (OUTPATIENT)
Dept: PSYCHIATRY | Facility: CLINIC | Age: 25
End: 2025-05-20
Payer: MEDICAID

## 2025-05-20 VITALS
OXYGEN SATURATION: 100 % | WEIGHT: 153 LBS | DIASTOLIC BLOOD PRESSURE: 85 MMHG | HEIGHT: 63 IN | SYSTOLIC BLOOD PRESSURE: 125 MMHG | BODY MASS INDEX: 27.11 KG/M2 | HEART RATE: 80 BPM

## 2025-05-20 DIAGNOSIS — F43.10 COMPLEX POSTTRAUMATIC STRESS DISORDER: ICD-10-CM

## 2025-05-20 DIAGNOSIS — F90.9 ADULT ADHD: Primary | ICD-10-CM

## 2025-05-20 DIAGNOSIS — F60.3 BORDERLINE PERSONALITY DISORDER: ICD-10-CM

## 2025-05-20 DIAGNOSIS — F33.1 MODERATE EPISODE OF RECURRENT MAJOR DEPRESSIVE DISORDER: ICD-10-CM

## 2025-05-20 RX ORDER — DEXTROAMPHETAMINE SACCHARATE, AMPHETAMINE ASPARTATE, DEXTROAMPHETAMINE SULFATE AND AMPHETAMINE SULFATE 1.25; 1.25; 1.25; 1.25 MG/1; MG/1; MG/1; MG/1
5 TABLET ORAL DAILY
Qty: 30 TABLET | Refills: 0 | Status: SHIPPED | OUTPATIENT
Start: 2025-05-20 | End: 2025-06-19

## 2025-06-03 ENCOUNTER — HOSPITAL ENCOUNTER (EMERGENCY)
Facility: HOSPITAL | Age: 25
Discharge: HOME | End: 2025-06-04
Attending: EMERGENCY MEDICINE | Admitting: EMERGENCY MEDICINE
Payer: MEDICAID

## 2025-06-03 DIAGNOSIS — R10.30 LOWER ABDOMINAL PAIN: Primary | ICD-10-CM

## 2025-06-03 LAB
ALBUMIN SERPL-MCNC: 4.9 G/DL (ref 3.5–5.2)
ALBUMIN/GLOB SERPL: 2 G/DL
ALP SERPL-CCNC: 77 U/L (ref 39–117)
ALT SERPL W P-5'-P-CCNC: 13 U/L (ref 1–33)
ANION GAP SERPL CALCULATED.3IONS-SCNC: 13.1 MMOL/L (ref 5–15)
AST SERPL-CCNC: 15 U/L (ref 1–32)
BASOPHILS # BLD AUTO: 0.01 10*3/MM3 (ref 0–0.2)
BASOPHILS NFR BLD AUTO: 0.1 % (ref 0–1.5)
BILIRUB SERPL-MCNC: 0.7 MG/DL (ref 0–1.2)
BILIRUB UR QL STRIP: NEGATIVE
BUN SERPL-MCNC: 14.7 MG/DL (ref 6–20)
BUN/CREAT SERPL: 17.9 (ref 7–25)
CALCIUM SPEC-SCNC: 10.7 MG/DL (ref 8.6–10.5)
CHLORIDE SERPL-SCNC: 98 MMOL/L (ref 98–107)
CLARITY UR: CLEAR
CO2 SERPL-SCNC: 23.9 MMOL/L (ref 22–29)
COLOR UR: YELLOW
CREAT SERPL-MCNC: 0.82 MG/DL (ref 0.57–1)
DEPRECATED RDW RBC AUTO: 41.2 FL (ref 37–54)
EGFRCR SERPLBLD CKD-EPI 2021: 101.9 ML/MIN/1.73
EOSINOPHIL # BLD AUTO: 0.03 10*3/MM3 (ref 0–0.4)
EOSINOPHIL NFR BLD AUTO: 0.3 % (ref 0.3–6.2)
ERYTHROCYTE [DISTWIDTH] IN BLOOD BY AUTOMATED COUNT: 12.7 % (ref 12.3–15.4)
GLOBULIN UR ELPH-MCNC: 2.5 GM/DL
GLUCOSE SERPL-MCNC: 83 MG/DL (ref 65–99)
GLUCOSE UR STRIP-MCNC: NEGATIVE MG/DL
HCG INTACT+B SERPL-ACNC: <0.5 MIU/ML
HCT VFR BLD AUTO: 44.2 % (ref 34–46.6)
HGB BLD-MCNC: 15 G/DL (ref 12–15.9)
HGB UR QL STRIP.AUTO: NEGATIVE
HOLD SPECIMEN: NORMAL
HOLD SPECIMEN: NORMAL
IMM GRANULOCYTES # BLD AUTO: 0.02 10*3/MM3 (ref 0–0.05)
IMM GRANULOCYTES NFR BLD AUTO: 0.2 % (ref 0–0.5)
KETONES UR QL STRIP: NEGATIVE
LEUKOCYTE ESTERASE UR QL STRIP.AUTO: NEGATIVE
LIPASE SERPL-CCNC: 37 U/L (ref 13–60)
LYMPHOCYTES # BLD AUTO: 1.75 10*3/MM3 (ref 0.7–3.1)
LYMPHOCYTES NFR BLD AUTO: 16.7 % (ref 19.6–45.3)
MCH RBC QN AUTO: 30.2 PG (ref 26.6–33)
MCHC RBC AUTO-ENTMCNC: 33.9 G/DL (ref 31.5–35.7)
MCV RBC AUTO: 88.9 FL (ref 79–97)
MONOCYTES # BLD AUTO: 0.45 10*3/MM3 (ref 0.1–0.9)
MONOCYTES NFR BLD AUTO: 4.3 % (ref 5–12)
NEUTROPHILS NFR BLD AUTO: 78.4 % (ref 42.7–76)
NEUTROPHILS NFR BLD AUTO: 8.21 10*3/MM3 (ref 1.7–7)
NITRITE UR QL STRIP: NEGATIVE
NRBC BLD AUTO-RTO: 0 /100 WBC (ref 0–0.2)
PH UR STRIP.AUTO: 6.5 [PH] (ref 5–8)
PLATELET # BLD AUTO: 249 10*3/MM3 (ref 140–450)
PMV BLD AUTO: 10.2 FL (ref 6–12)
POTASSIUM SERPL-SCNC: 4.3 MMOL/L (ref 3.5–5.2)
PROT SERPL-MCNC: 7.4 G/DL (ref 6–8.5)
PROT UR QL STRIP: NEGATIVE
RBC # BLD AUTO: 4.97 10*6/MM3 (ref 3.77–5.28)
SODIUM SERPL-SCNC: 135 MMOL/L (ref 136–145)
SP GR UR STRIP: 1.01 (ref 1–1.03)
UROBILINOGEN UR QL STRIP: NORMAL
WBC NRBC COR # BLD AUTO: 10.47 10*3/MM3 (ref 3.4–10.8)
WHOLE BLOOD HOLD COAG: NORMAL
WHOLE BLOOD HOLD SPECIMEN: NORMAL

## 2025-06-03 PROCEDURE — 80053 COMPREHEN METABOLIC PANEL: CPT

## 2025-06-03 PROCEDURE — 81003 URINALYSIS AUTO W/O SCOPE: CPT

## 2025-06-03 PROCEDURE — 36415 COLL VENOUS BLD VENIPUNCTURE: CPT

## 2025-06-03 PROCEDURE — 99285 EMERGENCY DEPT VISIT HI MDM: CPT

## 2025-06-03 PROCEDURE — 83690 ASSAY OF LIPASE: CPT

## 2025-06-03 PROCEDURE — 85025 COMPLETE CBC W/AUTO DIFF WBC: CPT

## 2025-06-03 PROCEDURE — 84702 CHORIONIC GONADOTROPIN TEST: CPT

## 2025-06-03 RX ORDER — SODIUM CHLORIDE 0.9 % (FLUSH) 0.9 %
10 SYRINGE (ML) INJECTION AS NEEDED
Status: DISCONTINUED | OUTPATIENT
Start: 2025-06-03 | End: 2025-06-04 | Stop reason: HOSPADM

## 2025-06-04 ENCOUNTER — APPOINTMENT (OUTPATIENT)
Dept: CT IMAGING | Facility: HOSPITAL | Age: 25
End: 2025-06-04
Payer: MEDICAID

## 2025-06-04 VITALS
HEART RATE: 78 BPM | WEIGHT: 149.25 LBS | OXYGEN SATURATION: 100 % | HEIGHT: 63 IN | RESPIRATION RATE: 16 BRPM | TEMPERATURE: 98.5 F | BODY MASS INDEX: 26.45 KG/M2 | SYSTOLIC BLOOD PRESSURE: 110 MMHG | DIASTOLIC BLOOD PRESSURE: 89 MMHG

## 2025-06-04 PROCEDURE — 74177 CT ABD & PELVIS W/CONTRAST: CPT

## 2025-06-04 PROCEDURE — 25510000001 IOPAMIDOL PER 1 ML: Performed by: EMERGENCY MEDICINE

## 2025-06-04 RX ORDER — IOPAMIDOL 755 MG/ML
100 INJECTION, SOLUTION INTRAVASCULAR
Status: COMPLETED | OUTPATIENT
Start: 2025-06-04 | End: 2025-06-04

## 2025-06-04 RX ADMIN — IOPAMIDOL 85 ML: 755 INJECTION, SOLUTION INTRAVENOUS at 00:31

## 2025-06-04 NOTE — ED PROVIDER NOTES
Time: 1:19 AM EDT  Date of encounter:  6/3/2025  Independent Historian/Clinical History and Information was obtained by:   Patient    History is limited by: N/A    Chief Complaint: abdominal pain      History of Present Illness:  Patient is a 24 y.o. year old female who presents to the emergency department for evaluation of Abdominal pain.  Patient describes intense pain in the lower abdomen mostly on the right.  She denies any history of any abdominal surgeries.  She does report nausea.  Chills reports decreased appetite.  No vomiting.  No diarrhea.  She did have a bowel movement.  No other complaints.      Patient Care Team  Primary Care Provider: Ellen Black APRN    Past Medical History:     Allergies   Allergen Reactions    Sulfamethoxazole-Trimethoprim Anaphylaxis, Other (See Comments) and Shortness Of Breath     And delerious    Aspergillus Species Hives and Other (See Comments)     PT REPORTS THAT SHE WAS INFORMED BY ALLERGIST    PT REPORTS THAT SHE WAS INFORMED BY ALLERGIST       <paragraph>PT REPORTS THAT SHE WAS INFORMED BY ALLERGIST </paragraph>      PT REPORTS THAT SHE WAS INFORMED BY ALLERGIST    [place] Other (See Comments)     PT REPORTS THAT SHE WAS INFORMED BY ALLERGIST    PT REPORTS THAT SHE WAS INFORMED BY ALLERGIST       <paragraph>PT REPORTS THAT SHE WAS INFORMED BY ALLERGIST </paragraph>      PT REPORTS THAT SHE WAS INFORMED BY ALLERGIST    Cat Dander Other (See Comments)     PT REPORTS THAT SHE WAS INFORMED BY ALLERGIST    PT REPORTS THAT SHE WAS INFORMED BY ALLERGIST       <paragraph>PT REPORTS THAT SHE WAS INFORMED BY ALLERGIST </paragraph>    [place] Other (See Comments)     PT REPORTS THAT SHE WAS INFORMED BY ALLERGIST    PT REPORTS THAT SHE WAS INFORMED BY ALLERGIST       <paragraph>PT REPORTS THAT SHE WAS INFORMED BY ALLERGIST </paragraph>      PT REPORTS THAT SHE WAS INFORMED BY ALLERGIST    Acer Negundo Allergy Skin Test Rash and Other (See Comments)     PT REPORTS THAT SHE  WAS INFORMED BY ALLERGIST  PT REPORTS THAT SHE WAS INFORMED BY ALLERGIST   <paragraph>PT REPORTS THAT SHE WAS INFORMED BY ALLERGIST </paragraph>  PT REPORTS THAT SHE WAS INFORMED BY ALLERGIST      <paragraph>PT REPORTS THAT SHE WAS INFORMED BY ALLERGIST</paragraph><paragraph> </paragraph><paragraph>PT REPORTS THAT SHE WAS INFORMED BY ALLERGIST   <paragraph>PT REPORTS THAT SHE WAS INFORMED BY ALLERGIST </paragraph>  PT REPORTS THAT SHE WAS INFORMED BY ALLERGIST</paragraph>    Adhesive Tape Dermatitis and Hives     Skin peals off, blisters    Dog Fennel Other (See Comments)     PT REPORTS THAT SHE WAS INFORMED BY ALLERGIST    PT REPORTS THAT SHE WAS INFORMED BY ALLERGIST       <paragraph>PT REPORTS THAT SHE WAS INFORMED BY ALLERGIST </paragraph>    Dust Mite Extract Other (See Comments)     PT REPORTS THAT SHE WAS INFORMED BY ALLERGIST    PT REPORTS THAT SHE WAS INFORMED BY ALLERGIST       <paragraph>PT REPORTS THAT SHE WAS INFORMED BY ALLERGIST </paragraph>      PT REPORTS THAT SHE WAS INFORMED BY ALLERGIST    [place] Other (See Comments)     PT REPORTS THAT SHE WAS INFORMED BY ALLERGIST    PT REPORTS THAT SHE WAS INFORMED BY ALLERGIST       <paragraph>PT REPORTS THAT SHE WAS INFORMED BY ALLERGIST </paragraph>      PT REPORTS THAT SHE WAS INFORMED BY ALLERGIST    Gramineae Pollens Other (See Comments)     PT REPORTS THAT SHE WAS INFORMED BY ALLERGIST    PT REPORTS THAT SHE WAS INFORMED BY ALLERGIST       <paragraph>PT REPORTS THAT SHE WAS INFORMED BY ALLERGIST </paragraph>    Mixed Ragweed Other (See Comments)     PT REPORTS THAT SHE WAS INFORMED BY ALLERGIST    PT REPORTS THAT SHE WAS INFORMED BY ALLERGIST       <paragraph>PT REPORTS THAT SHE WAS INFORMED BY ALLERGIST </paragraph>      PT REPORTS THAT SHE WAS INFORMED BY ALLERGIST    Quercus Robur Rash     PT REPORTS THAT SHE WAS INFORMED BY ALLERGIST    PT REPORTS THAT SHE WAS INFORMED BY ALLERGIST       <paragraph>PT REPORTS THAT SHE WAS INFORMED BY  ALLERGIST </paragraph>     Past Medical History:   Diagnosis Date    ADD (attention deficit disorder)     Allergic rhinitis     Bipolar disorder 2019    This diagnosis is no longer representative of my symptoms and was certainly a misdiagnosis but for records sake, you should know i had been misdiagnosed and medicated for bipolar for a few years    Borderline personality disorder 2021    Symptoms existed prior to a crisis - post crisis there was a severe worsening of behaviors to the point my personality and how i react is different from before that relationship ended    Chronic bronchitis     Chronic pain disorder 2012    Fibromyalgia and prob more undiagnosed    Chronic post-traumatic stress disorder (PTSD)     Depression Unsure - 2012    Persistent feelings of hopelessness among other negative feelings present since 2012 age 11    Fibromyalgia     Head injury Childhood    Had head stapled after cracking it in the shower w my mom as a toddler/baby. Hit my head frequently in my youth without ever looking into those occurences other than the initial head trauma    Hypertension     IBS (irritable bowel syndrome)     Migraine     Ovarian cyst     Panic disorder 2012    This was thrown in once by someone without further looking into it. I am currently struggling w either panic or anxiety attacks but i predominantly have meltdowns and the distinction hasnt been made yet by my providers    Personality disorder     PMS (premenstrual syndrome)     Self-injurious behavior 2016    Previous meltdowns have before involved wanting to self soothe with self harm or using it as a tool to calm down during a crisis and have control. The main trigger of the PSTD was in [id number] and was when the self harm became real    Suicide attempt 2017    Trauma     Urinary tract infection      Past Surgical History:   Procedure Laterality Date    CARDIAC ABLATION  09/2014    CARDIAC CATHETERIZATION  09/2014    Atrial tachycardia cleared at 2  years post op    COLONOSCOPY      IBS    TONSILLECTOMY       Family History   Problem Relation Age of Onset    Hypertension Father     ADD / ADHD Father         Unsure if diagnosed - dont think so - talks about it briefly and how it effects him but i dont think he pursues his mental health since he has been able to become highly successful anyways    OCD Father         Unsure if diagnosed - talked about    Breast cancer - additional onset Mother         ERROR    Depression Mother     Drug abuse Mother         Addiction to benzos, opioids, barbiturates, cocaine    Paranoid behavior Mother         Delusional Parasytosis    Suicide Attempts Mother         Unsure - her death was originally pitched to me as murder, then notated as accidental, then later revealed to possibly be suicide    No Known Problems Brother     No Known Problems Sister     Hypertension Paternal Grandfather          of heart attack    Heart attack Paternal Grandfather     Dementia Paternal Grandmother     Anxiety disorder Maternal Grandmother     OCD Maternal Grandmother     Osteoporosis Maternal Grandmother     Diabetes Maternal Grandfather     Alcohol abuse Maternal Aunt     Bipolar disorder Maternal Aunt     No Known Problems Maternal Uncle     No Known Problems Paternal Aunt     No Known Problems Paternal Uncle     No Known Problems Cousin     No Known Problems Other     Schizophrenia Neg Hx     Seizures Neg Hx     Self-Injurious Behavior  Neg Hx     Breast cancer Neg Hx     Uterine cancer Neg Hx     Ovarian cancer Neg Hx     Colon cancer Neg Hx        Home Medications:  Prior to Admission medications    Medication Sig Start Date End Date Taking? Authorizing Provider   albuterol sulfate  (90 Base) MCG/ACT inhaler Inhale 2 puffs. 24   Provider, MD Citlaly   amphetamine-dextroamphetamine (Adderall) 5 MG tablet Take 1 tablet by mouth Daily for 30 days. Use as booster 25  iRtu Cleaning APRN    amphetamine-dextroamphetamine XR (ADDERALL XR) 20 MG 24 hr capsule Take 1 capsule by mouth Daily for 30 days 5/6/25 6/5/25  Ritu Cleaning APRN   azelastine (ASTELIN) 0.1 % nasal spray ADMINISTER 2 SPRAYS INTO EACH NOSTRIL TWICE DAILY 3/25/25   Citlaly Vasquez MD   cephalexin (KEFLEX) 250 MG capsule Take 2 capsules by mouth 3 (Three) Times a Day. 5/25/25   Citlaly Vasquez MD   cetirizine (ZyrTEC Allergy) 10 MG tablet     Citlaly Vasquez MD   Cholecalciferol 50 MCG (2000 UT) chewable tablet Chew.    Citlaly Vasquez MD   fluticasone (FLONASE) 50 MCG/ACT nasal spray Administer 1 spray into the nostril(s) as directed by provider Daily. 6/5/24 6/5/25  Citlaly Vasquez MD   metoprolol succinate XL (TOPROL-XL) 50 MG 24 hr tablet Take 1 tablet by mouth Daily. 6/5/24 4/14/25  Citlaly Vasquez MD        Social History:   Social History     Tobacco Use    Smoking status: Never     Passive exposure: Past    Smokeless tobacco: Never    Tobacco comments:     Used very limitedly for like vape 2 months at most and was barely using it - never touched again   Vaping Use    Vaping status: Former    Start date: 11/1/2024    Quit date: 12/1/2024    Substances: Nicotine, Flavoring    Devices: Disposable   Substance Use Topics    Alcohol use: Not Currently     Comment: I dont drink at all unless socially and it’d be just a glass    Drug use: Yes     Frequency: 7.0 times per week     Types: Marijuana     Comment: Use medically, keep incredibly informed, have my certificate         Review of Systems:  Review of Systems   Constitutional:  Negative for chills and fever.   HENT:  Negative for congestion, ear pain and sore throat.    Eyes:  Negative for pain.   Respiratory:  Negative for cough, chest tightness and shortness of breath.    Cardiovascular:  Negative for chest pain.   Gastrointestinal:  Positive for abdominal pain and nausea. Negative for diarrhea and vomiting.   Genitourinary:  Negative for  "flank pain and hematuria.   Musculoskeletal:  Negative for joint swelling.   Skin:  Negative for pallor.   Neurological:  Negative for seizures and headaches.   All other systems reviewed and are negative.       Physical Exam:  /97   Pulse 81   Temp 98.4 °F (36.9 °C) (Oral)   Resp 18   Ht 160 cm (63\")   Wt 67.7 kg (149 lb 4 oz)   LMP 05/09/2025 (Exact Date)   SpO2 98%   BMI 26.44 kg/m²     Physical Exam  Constitutional:       Appearance: Normal appearance.   HENT:      Head: Normocephalic and atraumatic.      Nose: Nose normal.      Mouth/Throat:      Mouth: Mucous membranes are moist.   Eyes:      Extraocular Movements: Extraocular movements intact.      Conjunctiva/sclera: Conjunctivae normal.      Pupils: Pupils are equal, round, and reactive to light.   Cardiovascular:      Rate and Rhythm: Normal rate and regular rhythm.      Pulses: Normal pulses.      Heart sounds: Normal heart sounds.   Pulmonary:      Effort: Pulmonary effort is normal.      Breath sounds: Normal breath sounds.   Abdominal:      General: There is no distension.      Palpations: Abdomen is soft.      Tenderness: There is no abdominal tenderness.   Musculoskeletal:         General: Normal range of motion.      Cervical back: Normal range of motion.   Skin:     General: Skin is warm and dry.      Capillary Refill: Capillary refill takes less than 2 seconds.   Neurological:      General: No focal deficit present.      Mental Status: She is alert and oriented to person, place, and time. Mental status is at baseline.   Psychiatric:         Mood and Affect: Mood normal.         Behavior: Behavior normal.                  Medical Decision Making:      Comorbidities that affect care:    Fibromyalgia, ADD, personality disorder, depression, self injures behavior, bipolar, panic disorder, IBS, borderline personality disorder    External Notes reviewed:    Previous Clinic Note: Patient seen by behavioral health on 5/20/2025 for ADHD, " recurrent depression, complex posttraumatic stress disorder, borderline personality disorder      The following orders were placed and all results were independently analyzed by me:  Orders Placed This Encounter   Procedures    CT Abdomen Pelvis With Contrast    [place] Draw    Comprehensive Metabolic Panel    Lipase    Urinalysis With Microscopic If Indicated (No Culture) - Urine, Clean Catch    hCG, Quantitative, Pregnancy    CBC Auto Differential    NPO Diet NPO Type: Strict NPO    Undress & Gown    Insert Peripheral IV    CBC & Differential    Green Top (Gel)    Lavender Top    Gold Top - SST    Light Blue Top       Medications Given in the Emergency Department:  Medications   sodium chloride 0.9 % flush 10 mL (has no administration in time range)   iopamidol (ISOVUE-370) 76 % injection 100 mL (85 mL Intravenous Given 6/4/25 0031)        ED Course:         Labs:    Lab Results (last 24 hours)       Procedure Component Value Units Date/Time    CBC & Differential [[id number]]  (Abnormal) Collected: 06/03/25 2223    Specimen: Blood from Arm, Right Updated: 06/03/25 2231    Narrative:      The following orders were created for panel order CBC & Differential.  Procedure                               Abnormality         Status                     ---------                               -----------         ------                     CBC Auto Differential[[id number]]        Abnormal            Final result                 Please view results for these tests on the individual orders.    Comprehensive Metabolic Panel [[id number]]  (Abnormal) Collected: 06/03/25 2223    Specimen: Blood from Arm, Right Updated: 06/03/25 2259     Glucose 83 mg/dL      BUN 14.7 mg/dL      Creatinine 0.82 mg/dL      Sodium 135 mmol/L      Potassium 4.3 mmol/L      Chloride 98 mmol/L      CO2 23.9 mmol/L      Calcium 10.7 mg/dL      Total Protein 7.4 g/dL      Albumin 4.9 g/dL      ALT (SGPT) 13 U/L      AST (SGOT) 15 U/L      Alkaline Phosphatase  77 U/L      Total Bilirubin 0.7 mg/dL      Globulin 2.5 gm/dL      A/G Ratio 2.0 g/dL      BUN/Creatinine Ratio 17.9     Anion Gap 13.1 mmol/L      eGFR 101.9 mL/min/1.73     Narrative:      GFR Categories in Chronic Kidney Disease (CKD)              GFR Category          GFR (mL/min/1.73)    Interpretation  G1                    90 or greater        Normal or high (1)  G2                    60-89                Mild decrease (1)  G3a                   45-59                Mild to moderate decrease  G3b                   30-44                Moderate to severe decrease  G4                    15-29                Severe decrease  G5                    14 or less           Kidney failure    (1)In the absence of evidence of kidney disease, neither GFR category G1 or G2 fulfill the criteria for CKD.    eGFR calculation 2021 CKD-EPI creatinine equation, which does not include race as a factor    Lipase [[id number]]  (Normal) Collected: 06/03/25 2223    Specimen: Blood from Arm, Right Updated: 06/03/25 2259     Lipase 37 U/L     Urinalysis With Microscopic If Indicated (No Culture) - Urine, Clean Catch [[id number]]  (Normal) Collected: 06/03/25 2223    Specimen: Urine, Clean Catch Updated: 06/03/25 2232     Color, UA Yellow     Appearance, UA Clear     pH, UA 6.5     Specific Gravity, UA 1.009     Glucose, UA Negative     Ketones, UA Negative     Bilirubin, UA Negative     Blood, UA Negative     Protein, UA Negative     Leuk Esterase, UA Negative     Nitrite, UA Negative     Urobilinogen, UA 0.2 E.U./dL    Narrative:      Urine microscopic not indicated.    hCG, Quantitative, Pregnancy [[id number]] Collected: 06/03/25 2223    Specimen: Blood from Arm, Right Updated: 06/03/25 2251     HCG Quantitative <0.50 mIU/mL     Narrative:      HCG Ranges by Gestational Age    Females - non-pregnant premenopausal   </= 1mIU/mL HCG  Females - postmenopausal               </= 7mIU/mL HCG    3 Weeks       5.4   -      72 mIU/mL  4 Weeks       10.2   -     708 mIU/mL  5 Weeks       217   -   8,245 mIU/mL  6 Weeks       152   -  32,177 mIU/mL  7 Weeks     4,059   - 153,767 mIU/mL  8 Weeks    31,366   - 149,094 mIU/mL  9 Weeks    59,109   - 135,901 mIU/mL  10 Weeks   44,186   - 170,409 mIU/mL  12 Weeks   27,107   - 201,615 mIU/mL  14 Weeks   24,302   -  93,646 mIU/mL  15 Weeks   12,540   -  69,747 mIU/mL  16 Weeks    8,904   -  55,332 mIU/mL  17 Weeks    8,240   -  51,793 mIU/mL  18 Weeks    9,649   -  55,271 mIU/mL      CBC Auto Differential [[id number]]  (Abnormal) Collected: 06/03/25 2223    Specimen: Blood from Arm, Right Updated: 06/03/25 2231     WBC 10.47 10*3/mm3      RBC 4.97 10*6/mm3      Hemoglobin 15.0 g/dL      Hematocrit 44.2 %      MCV 88.9 fL      MCH 30.2 pg      MCHC 33.9 g/dL      RDW 12.7 %      RDW-SD 41.2 fl      MPV 10.2 fL      Platelets 249 10*3/mm3      Neutrophil % 78.4 %      Lymphocyte % 16.7 %      Monocyte % 4.3 %      Eosinophil % 0.3 %      Basophil % 0.1 %      Immature Grans % 0.2 %      Neutrophils, Absolute 8.21 10*3/mm3      Lymphocytes, Absolute 1.75 10*3/mm3      Monocytes, Absolute 0.45 10*3/mm3      Eosinophils, Absolute 0.03 10*3/mm3      Basophils, Absolute 0.01 10*3/mm3      Immature Grans, Absolute 0.02 10*3/mm3      nRBC 0.0 /100 WBC              Imaging:    CT Abdomen Pelvis With Contrast  Result Date: 6/4/2025  CT ABDOMEN PELVIS W CONTRAST-  Date of exam: 6/4/2025 12:19 AM.  Comparisons: 8/9/2024 (CT & U/S).  INDICATIONS: 24-year-old female w/ h/o recent UTI (urinary tract infection), treated w/ Keflex (cephalexin); the pt. has been noncompliant on her antibiotic treatment regimen.  TECHNIQUE: Axial CT images were obtained of the abdomen and pelvis following the uneventful intravenous administration of 100 mL (or less) of Isovue-370 contrast agent. Reconstructed 2D (two-dimensional) coronal and sagittal images were also obtained. Automated exposure control and iterative construction methods were used.  Additionally, the radiation dose reduction device was turned on for each scan per the ALARA (As Low as Reasonably Achievable) protocol. No oral contrast agent was administered for the study. Please see the electronic medical record, EMR (i.e., Epic), for the documented dose of intravenous contrast agent as well as the radiation dose.  FINDINGS: Interval resolution of the hemorrhagic right ovarian cyst since 8/9/2024. Otherwise, no acute findings are seen. No significant interval change is appreciated. There is nonobstructing left nephrolithiasis, seen previously. No hydronephrosis or ureterolithiasis is appreciated. No CT evidence of acute pyelonephritis. The urinary bladder is under distended, limiting its assessment. The other incidental/chronic findings are as described in the prior exam reports.       No acute findings are seen in the abdomen or pelvis by enhanced CT examination.    Portions of this note were completed with a voice recognition program.  6/4/2025 1:17 AM by Se Al MD on Workstation: [id number]          Differential Diagnosis and Discussion:    Abdominal Pain: Based on the patient's signs and symptoms, I considered abdominal aortic aneurysm, small bowel obstruction, pancreatitis, acute cholecystitis, acute appendecitis, peptic ulcer disease, gastritis, colitis, endocrine disorders, irritable bowel syndrome and other differential diagnosis an etiology of the patient's abdominal pain.    PROCEDURES:    Labs were collected in the emergency department and all labs were reviewed and interpreted by me.  CT scan was performed in the emergency department and the CT scan radiology impression was interpreted by me.    No orders to display       Procedures    MDM  Number of Diagnoses or Management Options  Lower abdominal pain  Diagnosis management comments: Patient is afebrile and nontoxic appearing. The patient is resting comfortably and feels better, is alert and in no distress. Repeat examination is  unremarkable and benign; in particular, there's no discomfort at McBurney's point and there is no pulsatile mass. The history, exam, diagnostic testing, and current condition does not suggest acute appendicitis, bowel obstruction, acute cholecystitis, bowel perforation, major gastrointestinal bleeding, severe diverticulitis, abdominal aortic aneurysm, mesenteric ischemia, volvulus, sepsis, or other significant pathology that warrants further testing, continued ED treatment, admission, for surgical evaluation at this point. The vital signs have been stable. The patient does not have uncontrollable pain, intractable vomiting, or other significant symptoms. The patient's condition is stable and appropriate for discharge from the emergency department. Recommended close follow up with their primary care physician. Discussed return precautions, discharge instructions and answered all their questions.        Amount and/or Complexity of Data Reviewed  Clinical lab tests: reviewed  Tests in the radiology section of CPT®: reviewed    Risk of Complications, Morbidity, and/or Mortality  Presenting problems: moderate  Management options: moderate                       Patient Care Considerations:    SEPSIS was considered but is NOT present in the emergency department as SIRS criteria is not present.      Consultants/Shared Management Plan:    None    Social Determinants of Health:    Patient is independent, reliable, and has access to care.       Disposition and Care Coordination:    Discharged: The patient is suitable and stable for discharge with no need for consideration of admission.    I have explained the patient´s condition, diagnoses and treatment plan based on the information available to me at this time. I have answered questions and addressed any concerns. The patient has a good  understanding of the patient´s diagnosis, condition, and treatment plan as can be expected at this point. The vital signs have been stable.  The patient´s condition is stable and appropriate for discharge from the emergency department.      The patient will pursue further outpatient evaluation with the primary care physician or other designated or consulting physician as outlined in the discharge instructions. They are agreeable to this plan of care and follow-up instructions have been explained in detail. The patient has received these instructions in written format and has expressed an understanding of the discharge instructions. The patient is aware that any significant change in condition or worsening of symptoms should prompt an immediate return to this or the closest emergency department or call to 911.  I have explained discharge medications and the need for follow up with the patient/caretakers. This was also printed in the discharge instructions. Patient was discharged with the following medications and follow up:      Medication List      No changes were made to your prescriptions during this visit.      Ellen Black, APRN  [address] [address]  [phone]             Final diagnoses:   Lower abdominal pain        ED Disposition       ED Disposition   Discharge    Condition   Stable    Comment   --               This medical record created using voice recognition software.             Leann Schneider MD  06/06/25 0197

## 2025-06-04 NOTE — ED NOTES
Pt reports dx with UTI 5/25/25 at [facility], was given Cephalexin TID for 7 days. Self reports that she has not been compliant with it due to having issues with eating three times a day. States took one today.

## 2025-06-14 ENCOUNTER — HOSPITAL ENCOUNTER (EMERGENCY)
Facility: HOSPITAL | Age: 25
Discharge: HOME | End: 2025-06-14
Attending: EMERGENCY MEDICINE
Payer: MEDICAID

## 2025-06-14 ENCOUNTER — APPOINTMENT (OUTPATIENT)
Dept: CT IMAGING | Facility: HOSPITAL | Age: 25
End: 2025-06-14
Payer: MEDICAID

## 2025-06-14 VITALS
RESPIRATION RATE: 17 BRPM | BODY MASS INDEX: 27.27 KG/M2 | DIASTOLIC BLOOD PRESSURE: 63 MMHG | OXYGEN SATURATION: 100 % | TEMPERATURE: 98.4 F | HEART RATE: 67 BPM | HEIGHT: 63 IN | WEIGHT: 153.88 LBS | SYSTOLIC BLOOD PRESSURE: 109 MMHG

## 2025-06-14 DIAGNOSIS — S09.90XA TRAUMATIC INJURY OF HEAD, INITIAL ENCOUNTER: Primary | ICD-10-CM

## 2025-06-14 DIAGNOSIS — S06.0X0A CONCUSSION WITHOUT LOSS OF CONSCIOUSNESS, INITIAL ENCOUNTER: ICD-10-CM

## 2025-06-14 PROCEDURE — 99284 EMERGENCY DEPT VISIT MOD MDM: CPT

## 2025-06-14 PROCEDURE — 70450 CT HEAD/BRAIN W/O DYE: CPT

## 2025-06-14 RX ORDER — ONDANSETRON 4 MG/1
4 TABLET, ORALLY DISINTEGRATING ORAL 4 TIMES DAILY PRN
Qty: 20 TABLET | Refills: 0 | Status: SHIPPED | OUTPATIENT
Start: 2025-06-14

## 2025-06-14 RX ORDER — PROPRANOLOL HYDROCHLORIDE 60 MG/1
60 CAPSULE, EXTENDED RELEASE ORAL DAILY
COMMUNITY
Start: 2025-06-09

## 2025-06-14 NOTE — DISCHARGE INSTRUCTIONS
Your head CT today did not show any acute brain injury or bleed.  However what you are describing sounds like concussion symptoms that may last several days or weeks intermittently.  You may experience intermittent headaches, dizziness, nausea, and vision changes below.  I am sending nausea medicine to the pharmacy for you to have should you need it.  You may take over-the-counter acetaminophen and Motrin as needed for aches and pains.  Avoid activities where you may have reoccurring head injuries until your symptoms have completely resolved.  Follow-up with FRANCO Dimas next week to ensure that your symptoms are improving with rest, time, and medications.  Return to the emergency department immediately for any acutely developing altered mental status, any persistent vomiting, any neurological symptoms or any new or worse concerns.

## 2025-06-14 NOTE — ED PROVIDER NOTES
"SHARED VISIT ATTESTATION:    This visit was performed by myself and an APC.  I personally approved the management plan/medical decision making and take responsibility for the patient management.      SHARED VISIT NOTE:    Patient is 24 y.o. year old female that presents to the ED for evaluation of head injury.     Physical Exam    ED Course:    /69 (Patient Position: Sitting)   Pulse 85   Temp 99.1 °F (37.3 °C) (Oral)   Resp 18   Ht 160 cm (63\")   Wt 69.8 kg (153 lb 14.1 oz)   LMP 05/07/2025 (Exact Date)   SpO2 98%   BMI 27.26 kg/m²       The following orders were placed and all results were independently analyzed by me:  Orders Placed This Encounter   Procedures    CT Head Without Contrast       Medications Given in the Emergency Department:  Medications - No data to display     ED Course:         Labs:    Lab Results (last 24 hours)       ** No results found for the last 24 hours. **             Imaging:    CT Head Without Contrast  Result Date: 6/14/2025  CT HEAD WO CONTRAST-  Date of exam: 6/14/2025 4:10 AM.  Comparison: None available.  INDICATIONS: 24-year-old female who struck the top of her head on a cabinet; head trauma/injury; headache.  TECHNIQUE: Axial CT images were obtained of the head/brain without contrast administration. Reconstructed 2D (two-dimensional) coronal and sagittal images were also obtained. Automated exposure control and iterative construction methods were used. Additionally, the radiation dose reduction device was turned on for each scan per the ALARA (As Low as Reasonably Achievable) protocol. Please see the electronic medical record (EMR; Epic) for the recorded radiation dose. Streak artifact is present on the study, obscuring detail. The [facility] CT  has been made aware of this persistent artifact on several head CT scans.  FINDINGS: A routine nonenhanced head CT was performed. No acute brain abnormality is identified. No acute intracranial hemorrhage. " No acute infarct is seen. The gray-white matter differentiation is preserved. No midline shift or acute intracranial mass effect is seen. The ventricular size and configuration are within normal limits. The extra-axial spaces are within normal limits. There may be mild cerebellar tonsillar ectopia, especially on the left. No acute skull fracture. No significant acute findings are seen with regard to the imaged orbits, paranasal sinuses, or middle ear clefts. Along the the anterior left middle cranial fossa and adjacent to the greater wing of the left sphenoid bone, a 1 cm focus of asymmetric ossification is present. The finding is seen on image 10 of series 3, image 22 of series 6, image 20 of series 7, and adjacent images. It may represent an osteoma or focal dural ossification. A densely ossified meningioma is possible. It is of doubtful clinical significance, however. There may be focal dural-based parafalcine interhemispheric ossification on the left, overlying the frontal-parietal region, as on image 44 of series 3 and image 39 of series 4, also likely of doubtful clinical significance.       No acute brain abnormality is seen. Specifically, no acute intracranial hemorrhage, no acute infarct, no significant intracranial mass lesion, and no acute intracranial mass effect are appreciated.    Portions of this note were completed with a voice recognition program.  6/14/2025 4:33 AM by Se Al MD on Workstation: [email or web address] [email or web address]        MDM:    Jody Mcclelland MD  06:01 EDT  06/14/25         Hipolito Mcclelland MD  06/14/25 0809

## 2025-06-14 NOTE — ED PROVIDER NOTES
Time: 3:33 AM EDT  Date of encounter:  6/14/2025  Independent Historian/Clinical History and Information was obtained by:   Patient    History is limited by: N/A    Chief Complaint: HEAD INJURY      History of Present Illness:    The patient is a 24 y.o. year old female who presents to the emergency department for evaluation of head injury.  She states that yesterday she raised up hitting the left side of her head on a kitchen cabinet.  She states that it did not knock her out but that she hit it very hard.  She states that she has had some right eye blurry vision.  She states that she is felt disoriented.  She states that one of her pet peeps is repetitive texting and states that she noticed she was repetitively texting the same words last night.  She is alert and oriented has a grossly intact neuroexam.  She denies any neck pain.  She reports no recent fevers.  Pupils are equal and light reactive.  Her EOMs are intact.  She states she has had some nausea but no vomiting.      Patient Care Team  Primary Care Provider: Ellen Black APRN    Past Medical History:     Allergies   Allergen Reactions    Sulfamethoxazole-Trimethoprim Anaphylaxis, Other (See Comments) and Shortness Of Breath     And delerious    Aspergillus Species Hives and Other (See Comments)     PT REPORTS THAT SHE WAS INFORMED BY ALLERGIST    PT REPORTS THAT SHE WAS INFORMED BY ALLERGIST       <paragraph>PT REPORTS THAT SHE WAS INFORMED BY ALLERGIST </paragraph>      PT REPORTS THAT SHE WAS INFORMED BY ALLERGIST    Milton Milner Other (See Comments)     PT REPORTS THAT SHE WAS INFORMED BY ALLERGIST    PT REPORTS THAT SHE WAS INFORMED BY ALLERGIST       <paragraph>PT REPORTS THAT SHE WAS INFORMED BY ALLERGIST </paragraph>      PT REPORTS THAT SHE WAS INFORMED BY ALLERGIST    Cat Dander Other (See Comments)     PT REPORTS THAT SHE WAS INFORMED BY ALLERGIST    PT REPORTS THAT SHE WAS INFORMED BY ALLERGIST       <paragraph>PT REPORTS THAT SHE WAS  INFORMED BY ALLERGIST </paragraph>    [place]man Other (See Comments)     PT REPORTS THAT SHE WAS INFORMED BY ALLERGIST    PT REPORTS THAT SHE WAS INFORMED BY ALLERGIST       <paragraph>PT REPORTS THAT SHE WAS INFORMED BY ALLERGIST </paragraph>      PT REPORTS THAT SHE WAS INFORMED BY ALLERGIST    Laurie Ogden Allergy Skin Test Rash and Other (See Comments)     PT REPORTS THAT SHE WAS INFORMED BY ALLERGIST  PT REPORTS THAT SHE WAS INFORMED BY ALLERGIST   <paragraph>PT REPORTS THAT SHE WAS INFORMED BY ALLERGIST </paragraph>  PT REPORTS THAT SHE WAS INFORMED BY ALLERGIST      <paragraph>PT REPORTS THAT SHE WAS INFORMED BY ALLERGIST</paragraph><paragraph> </paragraph><paragraph>PT REPORTS THAT SHE WAS INFORMED BY ALLERGIST   <paragraph>PT REPORTS THAT SHE WAS INFORMED BY ALLERGIST </paragraph>  PT REPORTS THAT SHE WAS INFORMED BY ALLERGIST</paragraph>    Adhesive Tape Dermatitis and Hives     Skin peals off, blisters    Dog Fennel Other (See Comments)     PT REPORTS THAT SHE WAS INFORMED BY ALLERGIST    PT REPORTS THAT SHE WAS INFORMED BY ALLERGIST       <paragraph>PT REPORTS THAT SHE WAS INFORMED BY ALLERGIST </paragraph>    Dust Mite Extract Other (See Comments)     PT REPORTS THAT SHE WAS INFORMED BY ALLERGIST    PT REPORTS THAT SHE WAS INFORMED BY ALLERGIST       <paragraph>PT REPORTS THAT SHE WAS INFORMED BY ALLERGIST </paragraph>      PT REPORTS THAT SHE WAS INFORMED BY ALLERGIST    [facility] Other (See Comments)     PT REPORTS THAT SHE WAS INFORMED BY ALLERGIST    PT REPORTS THAT SHE WAS INFORMED BY ALLERGIST       <paragraph>PT REPORTS THAT SHE WAS INFORMED BY ALLERGIST </paragraph>      PT REPORTS THAT SHE WAS INFORMED BY ALLERGIST    Gramineae Pollens Other (See Comments)     PT REPORTS THAT SHE WAS INFORMED BY ALLERGIST    PT REPORTS THAT SHE WAS INFORMED BY ALLERGIST       <paragraph>PT REPORTS THAT SHE WAS INFORMED BY ALLERGIST </paragraph>    Mixed Ragweed Other (See Comments)     PT REPORTS THAT SHE  WAS INFORMED BY ALLERGIST    PT REPORTS THAT SHE WAS INFORMED BY ALLERGIST       <paragraph>PT REPORTS THAT SHE WAS INFORMED BY ALLERGIST </paragraph>      PT REPORTS THAT SHE WAS INFORMED BY ALLERGIST    Kamrannlya Vazquez Rash     PT REPORTS THAT SHE WAS INFORMED BY ALLERGIST    PT REPORTS THAT SHE WAS INFORMED BY ALLERGIST       <paragraph>PT REPORTS THAT SHE WAS INFORMED BY ALLERGIST </paragraph>     Past Medical History:   Diagnosis Date    ADD (attention deficit disorder)     Allergic rhinitis     Bipolar disorder 2019    This diagnosis is no longer representative of my symptoms and was certainly a misdiagnosis but for records sake, you should know i had been misdiagnosed and medicated for bipolar for a few years    Borderline personality disorder 2021    Symptoms existed prior to a crisis - post crisis there was a severe worsening of behaviors to the point my personality and how i react is different from before that relationship ended    Chronic bronchitis     Chronic pain disorder 2012    Fibromyalgia and prob more undiagnosed    Chronic post-traumatic stress disorder (PTSD)     Depression Unsure - 2012    Persistent feelings of hopelessness among other negative feelings present since 2012 age 11    Fibromyalgia     Head injury Childhood    Had head stapled after cracking it in the shower w my mom as a toddler/baby. Hit my head frequently in my youth without ever looking into those occurences other than the initial head trauma    Hypertension     IBS (irritable bowel syndrome)     Migraine     Ovarian cyst     Panic disorder 2012    This was thrown in once by someone without further looking into it. I am currently struggling w either panic or anxiety attacks but i predominantly have meltdowns and the distinction hasnt been made yet by my providers    Personality disorder     PMS (premenstrual syndrome)     Self-injurious behavior 2016    Previous meltdowns have before involved wanting to self soothe with self  harm or using it as a tool to calm down during a crisis and have control. The main trigger of the PSTD was in [id number] and was when the self harm became real    Suicide attempt 2017    Trauma     Urinary tract infection      Past Surgical History:   Procedure Laterality Date    CARDIAC ABLATION  2014    CARDIAC CATHETERIZATION  2014    Atrial tachycardia cleared at 2 years post op    COLONOSCOPY      IBS    TONSILLECTOMY       Family History   Problem Relation Age of Onset    Hypertension Father     ADD / ADHD Father         Unsure if diagnosed - dont think so - talks about it briefly and how it effects him but i dont think he pursues his mental health since he has been able to become highly successful anyways    OCD Father         Unsure if diagnosed - talked about    Breast cancer - additional onset Mother         ERROR    Depression Mother     Drug abuse Mother         Addiction to benzos, opioids, barbiturates, cocaine    Paranoid behavior Mother         Delusional Parasytosis    Suicide Attempts Mother         Unsure - her death was originally pitched to me as murder, then notated as accidental, then later revealed to possibly be suicide    No Known Problems Brother     No Known Problems Sister     Hypertension Paternal Grandfather          of heart attack    Heart attack Paternal Grandfather     Dementia Paternal Grandmother     Anxiety disorder Maternal Grandmother     OCD Maternal Grandmother     Osteoporosis Maternal Grandmother     Diabetes Maternal Grandfather     Alcohol abuse Maternal Aunt     Bipolar disorder Maternal Aunt     No Known Problems Maternal Uncle     No Known Problems Paternal Aunt     No Known Problems Paternal Uncle     No Known Problems Cousin     No Known Problems Other     Schizophrenia Neg Hx     Seizures Neg Hx     Self-Injurious Behavior  Neg Hx     Breast cancer Neg Hx     Uterine cancer Neg Hx     Ovarian cancer Neg Hx     Colon cancer Neg Hx        Home  Medications:  Prior to Admission medications    Medication Sig Start Date End Date Taking? Authorizing Provider   albuterol sulfate  (90 Base) MCG/ACT inhaler Inhale 2 puffs. 5/23/24   Citlaly Vasquez MD   amphetamine-dextroamphetamine (Adderall) 5 MG tablet Take 1 tablet by mouth Daily for 30 days. Use as booster 5/20/25 6/19/25  Ritu Cleaning APRN   azelastine (ASTELIN) 0.1 % nasal spray ADMINISTER 2 SPRAYS INTO EACH NOSTRIL TWICE DAILY 3/25/25   Citlaly Vasquez MD   cephalexin (KEFLEX) 250 MG capsule Take 2 capsules by mouth 3 (Three) Times a Day. 5/25/25   Citlaly Vasquez MD   cetirizine (ZyrTEC Allergy) 10 MG tablet     Citlaly Vasquez MD   Cholecalciferol 50 MCG (2000 UT) chewable tablet Chew.    Citlaly Vasquez MD   fluticasone (FLONASE) 50 MCG/ACT nasal spray Administer 1 spray into the nostril(s) as directed by provider Daily. 6/5/24 6/5/25  Citlaly Vasquez MD   metoprolol succinate XL (TOPROL-XL) 50 MG 24 hr tablet Take 1 tablet by mouth Daily. 6/5/24 4/14/25  Citlaly Vasquez MD        Social History:   Social History     Tobacco Use    Smoking status: Never     Passive exposure: Past    Smokeless tobacco: Never    Tobacco comments:     Used very limitedly for like vape 2 months at most and was barely using it - never touched again   Vaping Use    Vaping status: Former    Start date: 11/1/2024    Quit date: 12/1/2024    Substances: Nicotine, Flavoring    Devices: Disposable   Substance Use Topics    Alcohol use: Not Currently     Comment: I dont drink at all unless socially and it’d be just a glass    Drug use: Yes     Frequency: 7.0 times per week     Types: Marijuana     Comment: Use medically, keep incredibly informed, have my certificate         Review of Systems:  Review of Systems   Constitutional:  Negative for chills and fever.   HENT:  Negative for congestion, ear pain and sore throat.    Eyes:  Positive for photophobia. Negative for pain.  "  Respiratory:  Negative for cough, chest tightness and shortness of breath.    Cardiovascular:  Negative for chest pain.   Gastrointestinal:  Negative for abdominal pain, diarrhea, nausea and vomiting.   Genitourinary:  Negative for flank pain and hematuria.   Musculoskeletal:  Negative for back pain, joint swelling, neck pain and neck stiffness.   Skin:  Negative for pallor and rash.   Neurological:  Positive for headaches. Negative for seizures.   Psychiatric/Behavioral:  Positive for confusion.    All other systems reviewed and are negative.       Physical Exam:  /63 (BP Location: Right arm, Patient Position: Sitting)   Pulse 67   Temp 98.4 °F (36.9 °C) (Oral)   Resp 17   Ht 160 cm (63\")   Wt 69.8 kg (153 lb 14.1 oz)   LMP 05/07/2025 (Exact Date)   SpO2 100%   BMI 27.26 kg/m²     Physical Exam  Vitals and nursing note reviewed.   Constitutional:       General: She is not in acute distress.     Appearance: Normal appearance. She is not ill-appearing or toxic-appearing.   HENT:      Head: Normocephalic and atraumatic.   Eyes:      General: No scleral icterus.     Extraocular Movements: Extraocular movements intact.      Conjunctiva/sclera: Conjunctivae normal.      Pupils: Pupils are equal, round, and reactive to light.   Cardiovascular:      Rate and Rhythm: Normal rate and regular rhythm.      Pulses: Normal pulses.   Pulmonary:      Effort: Pulmonary effort is normal. No respiratory distress.   Musculoskeletal:         General: Normal range of motion.      Cervical back: Normal range of motion and neck supple. No rigidity or tenderness.   Lymphadenopathy:      Cervical: No cervical adenopathy.   Skin:     General: Skin is warm and dry.      Capillary Refill: Capillary refill takes less than 2 seconds.      Findings: No rash.   Neurological:      General: No focal deficit present.      Mental Status: She is alert and oriented to person, place, and time. Mental status is at baseline.   Psychiatric:  "        Mood and Affect: Mood normal.         Behavior: Behavior normal.          Medical Decision Making:      Comorbidities that affect care:    Fibromyalgia, ADD, personality disorder, depression, self injures behavior, bipolar disorder, panic disorder, trauma, urinary tract infection, PMS, chronic bronchitis, IBS, chronic post traumatic stress disorder, hypertension, head injury, suicide attempt, borderline personality disorder chronic pain disorder, migraines, ovarian cyst, allergic rhinitis    External Notes reviewed:    Previous ED Note: She was seen in the ED on 6/3/2025 for lower abdominal pain.      The following orders were placed and all results were independently analyzed by me:  Orders Placed This Encounter   Procedures    CT Head Without Contrast       Medications Given in the Emergency Department:  Medications - No data to display     ED Course:         Labs:    Lab Results (last 24 hours)       ** No results found for the last 24 hours. **             Imaging:    CT Head Without Contrast  Result Date: 6/14/2025  CT HEAD WO CONTRAST-  Date of exam: 6/14/2025 4:10 AM.  Comparison: None available.  INDICATIONS: 24-year-old female who struck the top of her head on a cabinet; head trauma/injury; headache.  TECHNIQUE: Axial CT images were obtained of the head/brain without contrast administration. Reconstructed 2D (two-dimensional) coronal and sagittal images were also obtained. Automated exposure control and iterative construction methods were used. Additionally, the radiation dose reduction device was turned on for each scan per the ALARA (As Low as Reasonably Achievable) protocol. Please see the electronic medical record (EMR; Epic) for the recorded radiation dose. Streak artifact is present on the study, obscuring detail. The [facility] CT  has been made aware of this persistent artifact on several head CT scans.  FINDINGS: A routine nonenhanced head CT was performed. No acute brain  abnormality is identified. No acute intracranial hemorrhage. No acute infarct is seen. The gray-white matter differentiation is preserved. No midline shift or acute intracranial mass effect is seen. The ventricular size and configuration are within normal limits. The extra-axial spaces are within normal limits. There may be mild cerebellar tonsillar ectopia, especially on the left. No acute skull fracture. No significant acute findings are seen with regard to the imaged orbits, paranasal sinuses, or middle ear clefts. Along the the anterior left middle cranial fossa and adjacent to the greater wing of the left sphenoid bone, a 1 cm focus of asymmetric ossification is present. The finding is seen on image 10 of series 3, image 22 of series 6, image 20 of series 7, and adjacent images. It may represent an osteoma or focal dural ossification. A densely ossified meningioma is possible. It is of doubtful clinical significance, however. There may be focal dural-based parafalcine interhemispheric ossification on the left, overlying the frontal-parietal region, as on image 44 of series 3 and image 39 of series 4, also likely of doubtful clinical significance.       No acute brain abnormality is seen. Specifically, no acute intracranial hemorrhage, no acute infarct, no significant intracranial mass lesion, and no acute intracranial mass effect are appreciated.    Portions of this note were completed with a voice recognition program.  6/14/2025 4:33 AM by Se Al MD on Workstation: [email or web address]          Differential Diagnosis and Discussion:    Dizziness: Based on the patient's history, signs, and symptoms, the diffential diagnosis includes but is not limited to meningitis, stroke, sepsis, subarachnoid hemorrhage, intracranial bleeding, encephalitis, vertigo, electrolyte imbalance, and metabolic disorders.  Headache: Differential diagnosis includes but is not limited to migraine, cluster headache, hypertension, tumor,  subarachnoid bleeding, pseudotumor cerebri, temporal arteritis, infections, tension headache, and TMJ syndrome.    PROCEDURES:    CT scan was performed in the emergency department and the CT scan radiology impression was interpreted by me.    No orders to display       Procedures    MDM  Number of Diagnoses or Management Options  Concussion without loss of consciousness, initial encounter: new and requires workup  Traumatic injury of head, initial encounter: new and requires workup     Amount and/or Complexity of Data Reviewed  Tests in the radiology section of CPT®: reviewed    Risk of Complications, Morbidity, and/or Mortality  Presenting problems: low  Diagnostic procedures: low  Management options: low    Patient Progress  Patient progress: stable     Patient Care Considerations:    LABS: I considered ordering labs, however considering the patient stable condition and complaint I did not feel was necessary at this time.      Consultants/Shared Management Plan:    None    Social Determinants of Health:    Patient is independent, reliable, and has access to care.       Disposition and Care Coordination:    Discharged: The patient is suitable and stable for discharge with no need for consideration of admission.    I have explained the patient´s condition, diagnoses and treatment plan based on the information available to me at this time. I have answered questions and addressed any concerns. The patient has a good  understanding of the patient´s diagnosis, condition, and treatment plan as can be expected at this point. The vital signs have been stable. The patient´s condition is stable and appropriate for discharge from the emergency department.      The patient will pursue further outpatient evaluation with the primary care physician or other designated or consulting physician as outlined in the discharge instructions. They are agreeable to this plan of care and follow-up instructions have been explained in detail.  The patient has received these instructions in written format and has expressed an understanding of the discharge instructions. The patient is aware that any significant change in condition or worsening of symptoms should prompt an immediate return to this or the closest emergency department or call to 1.  I have explained discharge medications and the need for follow up with the patient/caretakers. This was also printed in the discharge instructions. Patient was discharged with the following medications and follow up:      Medication List        New Prescriptions      ondansetron ODT 4 MG disintegrating tablet  Commonly known as: ZOFRAN-ODT  Place 1 tablet on the tongue 4 (Four) Times a Day As Needed for Nausea or Vomiting.               Where to Get Your Medications        These medications were sent to [id number] - [place], [place] - [address] AT [facility] - [phone]  - [phone] FX  [address] [place] [address]      Phone: [phone]   ondansetron ODT 4 MG disintegrating tablet      Ellen Black, APRN  [address]  [address]  [facility] [address]  [phone]    Call   FOR FOLLOW UP       Final diagnoses:   Traumatic injury of head, initial encounter   Concussion without loss of consciousness, initial encounter        ED Disposition       ED Disposition   Discharge    Condition   Stable    Comment   --               This medical record created using voice recognition software.             Julissa Gotti APRN  06/14/25 0809

## 2025-06-16 ENCOUNTER — TRANSCRIBE ORDERS (OUTPATIENT)
Dept: ADMINISTRATIVE | Facility: HOSPITAL | Age: 25
End: 2025-06-16
Payer: MEDICAID

## 2025-06-16 DIAGNOSIS — N64.52 BREAST DISCHARGE: Primary | ICD-10-CM

## 2025-06-20 ENCOUNTER — OFFICE VISIT (OUTPATIENT)
Dept: PSYCHIATRY | Facility: CLINIC | Age: 25
End: 2025-06-20
Payer: MEDICAID

## 2025-06-20 VITALS
WEIGHT: 153 LBS | DIASTOLIC BLOOD PRESSURE: 88 MMHG | BODY MASS INDEX: 27.1 KG/M2 | SYSTOLIC BLOOD PRESSURE: 139 MMHG | HEART RATE: 100 BPM

## 2025-06-20 DIAGNOSIS — F90.9 ADULT ADHD: Primary | ICD-10-CM

## 2025-06-20 DIAGNOSIS — F60.3 BORDERLINE PERSONALITY DISORDER: ICD-10-CM

## 2025-06-20 DIAGNOSIS — F33.1 MODERATE EPISODE OF RECURRENT MAJOR DEPRESSIVE DISORDER: ICD-10-CM

## 2025-06-20 DIAGNOSIS — F43.10 COMPLEX POSTTRAUMATIC STRESS DISORDER: ICD-10-CM

## 2025-06-20 RX ORDER — CHLORAL HYDRATE 500 MG
CAPSULE ORAL
COMMUNITY

## 2025-06-20 RX ORDER — PHENAZOPYRIDINE HYDROCHLORIDE 200 MG/1
TABLET, FILM COATED ORAL
COMMUNITY
Start: 2025-05-25

## 2025-06-20 RX ORDER — DEXTROAMPHETAMINE SACCHARATE, AMPHETAMINE ASPARTATE, DEXTROAMPHETAMINE SULFATE AND AMPHETAMINE SULFATE 1.25; 1.25; 1.25; 1.25 MG/1; MG/1; MG/1; MG/1
5 TABLET ORAL 2 TIMES DAILY
Qty: 60 TABLET | Refills: 0 | Status: SHIPPED | OUTPATIENT
Start: 2025-06-20 | End: 2025-07-20
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
## 2025-06-23 ENCOUNTER — HOSPITAL ENCOUNTER (OUTPATIENT)
Dept: ULTRASOUND IMAGING | Facility: HOSPITAL | Age: 25
Discharge: HOME | End: 2025-06-23
Payer: MEDICAID

## 2025-06-23 DIAGNOSIS — N64.52 BREAST DISCHARGE: ICD-10-CM

## 2025-06-23 PROCEDURE — 76642 ULTRASOUND BREAST LIMITED: CPT

## 2025-07-09 ENCOUNTER — TRANSCRIBE ORDERS (OUTPATIENT)
Dept: ADMINISTRATIVE | Facility: HOSPITAL | Age: 25
End: 2025-07-09
Payer: MEDICAID

## 2025-07-09 DIAGNOSIS — S09.90XD CLOSED HEAD INJURY, SUBSEQUENT ENCOUNTER: ICD-10-CM

## 2025-07-09 DIAGNOSIS — R42 DIZZINESS: ICD-10-CM

## 2025-07-13 ENCOUNTER — APPOINTMENT (OUTPATIENT)
Dept: GENERAL RADIOLOGY | Facility: HOSPITAL | Age: 25
End: 2025-07-13
Payer: MEDICAID

## 2025-07-13 ENCOUNTER — HOSPITAL ENCOUNTER (EMERGENCY)
Facility: HOSPITAL | Age: 25
Discharge: HOME | End: 2025-07-13
Attending: EMERGENCY MEDICINE | Admitting: EMERGENCY MEDICINE
Payer: MEDICAID

## 2025-07-13 VITALS
RESPIRATION RATE: 16 BRPM | TEMPERATURE: 98.4 F | HEART RATE: 83 BPM | HEIGHT: 63 IN | DIASTOLIC BLOOD PRESSURE: 78 MMHG | BODY MASS INDEX: 27.52 KG/M2 | SYSTOLIC BLOOD PRESSURE: 128 MMHG | OXYGEN SATURATION: 99 % | WEIGHT: 155.3 LBS

## 2025-07-13 DIAGNOSIS — M25.511 ACUTE PAIN OF RIGHT SHOULDER: Primary | ICD-10-CM

## 2025-07-13 PROCEDURE — 73030 X-RAY EXAM OF SHOULDER: CPT

## 2025-07-13 PROCEDURE — 99283 EMERGENCY DEPT VISIT LOW MDM: CPT

## 2025-07-13 RX ORDER — CYCLOBENZAPRINE HCL 10 MG
10 TABLET ORAL 3 TIMES DAILY PRN
Qty: 20 TABLET | Refills: 0 | Status: SHIPPED | OUTPATIENT
Start: 2025-07-13

## 2025-07-14 NOTE — DISCHARGE INSTRUCTIONS
Your x-ray is negative for any fractures or dislocations.  I have sent muscle relaxers to pharmacy.  You can take with Tylenol/ibuprofen as needed    Follow-up with PCP for outpatient MRI

## 2025-07-14 NOTE — ED PROVIDER NOTES
"SHARED VISIT ATTESTATION:    This visit was performed by myself and an APC.  I personally approved the management plan/medical decision making and take responsibility for the patient management.      SHARED VISIT NOTE:   Patient is 24 y.o. year old female that presents to the ED for evaluation of right shoulder pain.     Physical Exam    ED Course:    /78 (BP Location: Left arm, Patient Position: Sitting)   Pulse 83   Temp 98.4 °F (36.9 °C) (Oral)   Resp 16   Ht 160 cm (62.99\")   Wt 70.4 kg (155 lb 4.8 oz)   LMP 07/07/2025 (Exact Date)   SpO2 99%   BMI 27.52 kg/m²       The following orders were placed and all results were independently analyzed by me:  Orders Placed This Encounter   Procedures    XR Shoulder 2+ View Right       Medications Given in the Emergency Department:  Medications - No data to display     ED Course:         Labs:    Lab Results (last 24 hours)       ** No results found for the last 24 hours. **             Imaging:    No Radiology Exams Resulted Within Past 24 Hours    MDM:    Procedures    X-ray were performed in the emergency department and all X-ray impressions were independently interpreted by me.                     Hugo Juarez DO  21:01 EDT  07/13/25         Hugo Juarez DO  07/13/25 2101    "

## 2025-07-14 NOTE — ED PROVIDER NOTES
"Time: 8:56 PM EDT  Date of encounter:  7/13/2025  Independent Historian/Clinical History and Information was obtained by:   Patient    History is limited by: N/A    Chief Complaint   Patient presents with    Shoulder Injury         History of Present Illness:  Patient is a 24 y.o. year old female who presents to the emergency department for evaluation of right shoulder pain.  Patient states she was at the gym during her preworkout warming up when she felt like her right shoulder \" dropped.\"  Patient states she was not lifting any weight at the time.  Denies fall or injury to her right shoulder.  States she has a history of left shoulder dislocation and she reduced it herself.  Is concerned she possibly has Henrry-Danlos syndrome.  Patient has full range of motion of her shoulders    Patient Care Team  Primary Care Provider: Ellen Black APRN    Past Medical History:     Allergies   Allergen Reactions    Sulfamethoxazole-Trimethoprim Anaphylaxis, Other (See Comments) and Shortness Of Breath     And delerious    Aspergillus Species Hives and Other (See Comments)     PT REPORTS THAT SHE WAS INFORMED BY ALLERGIST    PT REPORTS THAT SHE WAS INFORMED BY ALLERGIST       <paragraph>PT REPORTS THAT SHE WAS INFORMED BY ALLERGIST </paragraph>      PT REPORTS THAT SHE WAS INFORMED BY ALLERGIST    [place] Other (See Comments)     PT REPORTS THAT SHE WAS INFORMED BY ALLERGIST    PT REPORTS THAT SHE WAS INFORMED BY ALLERGIST       <paragraph>PT REPORTS THAT SHE WAS INFORMED BY ALLERGIST </paragraph>      PT REPORTS THAT SHE WAS INFORMED BY ALLERGIST    Cat Dander Other (See Comments)     PT REPORTS THAT SHE WAS INFORMED BY ALLERGIST    PT REPORTS THAT SHE WAS INFORMED BY ALLERGIST       <paragraph>PT REPORTS THAT SHE WAS INFORMED BY ALLERGIST </paragraph>    [place] Other (See Comments)     PT REPORTS THAT SHE WAS INFORMED BY ALLERGIST    PT REPORTS THAT SHE WAS INFORMED BY ALLERGIST       <paragraph>PT REPORTS THAT SHE " WAS INFORMED BY ALLERGIST </paragraph>      PT REPORTS THAT SHE WAS INFORMED BY ALLERGIST    Laurie Ogden Allergy Skin Test Rash and Other (See Comments)     PT REPORTS THAT SHE WAS INFORMED BY ALLERGIST  PT REPORTS THAT SHE WAS INFORMED BY ALLERGIST   <paragraph>PT REPORTS THAT SHE WAS INFORMED BY ALLERGIST </paragraph>  PT REPORTS THAT SHE WAS INFORMED BY ALLERGIST      <paragraph>PT REPORTS THAT SHE WAS INFORMED BY ALLERGIST</paragraph><paragraph> </paragraph><paragraph>PT REPORTS THAT SHE WAS INFORMED BY ALLERGIST   <paragraph>PT REPORTS THAT SHE WAS INFORMED BY ALLERGIST </paragraph>  PT REPORTS THAT SHE WAS INFORMED BY ALLERGIST</paragraph>    Adhesive Tape Dermatitis and Hives     Skin peals off, blisters    Dog Fennel Other (See Comments)     PT REPORTS THAT SHE WAS INFORMED BY ALLERGIST    PT REPORTS THAT SHE WAS INFORMED BY ALLERGIST       <paragraph>PT REPORTS THAT SHE WAS INFORMED BY ALLERGIST </paragraph>    Dust Mite Extract Other (See Comments)     PT REPORTS THAT SHE WAS INFORMED BY ALLERGIST    PT REPORTS THAT SHE WAS INFORMED BY ALLERGIST       <paragraph>PT REPORTS THAT SHE WAS INFORMED BY ALLERGIST </paragraph>      PT REPORTS THAT SHE WAS INFORMED BY ALLERGIST    [place] Other (See Comments)     PT REPORTS THAT SHE WAS INFORMED BY ALLERGIST    PT REPORTS THAT SHE WAS INFORMED BY ALLERGIST       <paragraph>PT REPORTS THAT SHE WAS INFORMED BY ALLERGIST </paragraph>      PT REPORTS THAT SHE WAS INFORMED BY ALLERGIST    Gramineae Pollens Other (See Comments)     PT REPORTS THAT SHE WAS INFORMED BY ALLERGIST    PT REPORTS THAT SHE WAS INFORMED BY ALLERGIST       <paragraph>PT REPORTS THAT SHE WAS INFORMED BY ALLERGIST </paragraph>    Mixed Ragweed Other (See Comments)     PT REPORTS THAT SHE WAS INFORMED BY ALLERGIST    PT REPORTS THAT SHE WAS INFORMED BY ALLERGIST       <paragraph>PT REPORTS THAT SHE WAS INFORMED BY ALLERGIST </paragraph>      PT REPORTS THAT SHE WAS INFORMED BY ALLERGIST     Roxie Galan     PT REPORTS THAT SHE WAS INFORMED BY ALLERGIST    PT REPORTS THAT SHE WAS INFORMED BY ALLERGIST       <paragraph>PT REPORTS THAT SHE WAS INFORMED BY ALLERGIST </paragraph>     Past Medical History:   Diagnosis Date    ADD (attention deficit disorder)     Allergic rhinitis     Bipolar disorder 2019    This diagnosis is no longer representative of my symptoms and was certainly a misdiagnosis but for records sake, you should know i had been misdiagnosed and medicated for bipolar for a few years    Borderline personality disorder 2021    Symptoms existed prior to a crisis - post crisis there was a severe worsening of behaviors to the point my personality and how i react is different from before that relationship ended    Chronic bronchitis     Chronic pain disorder 2012    Fibromyalgia and prob more undiagnosed    Chronic post-traumatic stress disorder (PTSD)     Depression Unsure - 2012    Persistent feelings of hopelessness among other negative feelings present since 2012 age 11    Fibromyalgia     Head injury Childhood    Had head stapled after cracking it in the shower w my mom as a toddler/baby. Hit my head frequently in my youth without ever looking into those occurences other than the initial head trauma    Hypertension     IBS (irritable bowel syndrome)     Migraine     Ovarian cyst     Panic disorder 2012    This was thrown in once by someone without further looking into it. I am currently struggling w either panic or anxiety attacks but i predominantly have meltdowns and the distinction hasnt been made yet by my providers    Personality disorder     PMS (premenstrual syndrome)     Self-injurious behavior 2016    Previous meltdowns have before involved wanting to self soothe with self harm or using it as a tool to calm down during a crisis and have control. The main trigger of the PSTD was in [id number] and was when the self harm became real    Suicide attempt 2017    Trauma     Urinary  tract infection      Past Surgical History:   Procedure Laterality Date    CARDIAC ABLATION  2014    CARDIAC CATHETERIZATION  2014    Atrial tachycardia cleared at 2 years post op    COLONOSCOPY      IBS    TONSILLECTOMY      WISDOM TOOTH EXTRACTION  01/15/2025     Family History   Problem Relation Age of Onset    Hypertension Father     ADD / ADHD Father         Unsure if diagnosed - dont think so - talks about it briefly and how it effects him but i dont think he pursues his mental health since he has been able to become highly successful anyways    OCD Father         Unsure if diagnosed - talked about    Breast cancer - additional onset Mother         ERROR    Depression Mother     Drug abuse Mother         Addiction to benzos, opioids, barbiturates, cocaine    Paranoid behavior Mother         Delusional Parasytosis    Suicide Attempts Mother         Unsure - her death was originally pitched to me as murder, then notated as accidental, then later revealed to possibly be suicide    No Known Problems Brother     No Known Problems Sister     Hypertension Paternal Grandfather          of heart attack    Heart attack Paternal Grandfather     Dementia Paternal Grandmother     Anxiety disorder Maternal Grandmother     OCD Maternal Grandmother     Osteoporosis Maternal Grandmother     Diabetes Maternal Grandfather     Alcohol abuse Maternal Aunt     Bipolar disorder Maternal Aunt     No Known Problems Maternal Uncle     No Known Problems Paternal Aunt     No Known Problems Paternal Uncle     No Known Problems Cousin     No Known Problems Other     Schizophrenia Neg Hx     Seizures Neg Hx     Self-Injurious Behavior  Neg Hx     Breast cancer Neg Hx     Uterine cancer Neg Hx     Ovarian cancer Neg Hx     Colon cancer Neg Hx        Home Medications:  Prior to Admission medications    Medication Sig Start Date End Date Taking? Authorizing Provider   Acetylcysteine capsule capsule Take 1 capsule by mouth.     Citlaly Vasquez MD   albuterol sulfate  (90 Base) MCG/ACT inhaler Inhale 2 puffs. 5/23/24   Citlaly Vasquez MD   amphetamine-dextroamphetamine (Adderall) 5 MG tablet Take 1 tablet by mouth 2 (Two) Times a Day for 30 days. 6/20/25 7/20/25  Ritu Cleaning APRN   azelastine (ASTELIN) 0.1 % nasal spray ADMINISTER 2 SPRAYS INTO EACH NOSTRIL TWICE DAILY 3/25/25   Citlaly Vasquez MD   cetirizine (ZyrTEC Allergy) 10 MG tablet     Citlaly Vasquez MD   Cholecalciferol 50 MCG (2000 UT) chewable tablet Chew.    Citlaly Vasquez MD   fluticasone (FLONASE) 50 MCG/ACT nasal spray Administer 1 spray into the nostril(s) as directed by provider Daily. 6/5/24 6/5/25  Citlaly Vasquez MD   Omega-3 Fatty Acids (fish oil) 1000 MG capsule capsule Take  by mouth.    Citlaly Vasquez MD   ondansetron ODT (ZOFRAN-ODT) 4 MG disintegrating tablet Place 1 tablet on the tongue 4 (Four) Times a Day As Needed for Nausea or Vomiting. 6/14/25   Julissa Gotti APRN   propranolol LA (INDERAL LA) 60 MG 24 hr capsule Take 1 capsule by mouth Daily. 6/9/25   Citlaly Vasquez MD   cephalexin (KEFLEX) 250 MG capsule Take 2 capsules by mouth 3 (Three) Times a Day. 5/25/25 7/13/25  Citlaly Vasquez MD   metoprolol succinate XL (TOPROL-XL) 50 MG 24 hr tablet Take 1 tablet by mouth Daily. 6/5/24 7/13/25  Citlaly Vasquez MD   phenazopyridine (PYRIDIUM) 200 MG tablet  5/25/25 7/13/25  Citlaly Vasquez MD        Social History:   Social History     Tobacco Use    Smoking status: Never     Passive exposure: Past    Smokeless tobacco: Never    Tobacco comments:     Used very limitedly for like vape 2 months at most and was barely using it - never touched again   Vaping Use    Vaping status: Former    Start date: 11/1/2024    Quit date: 12/1/2024    Substances: Nicotine, Flavoring    Devices: Disposable   Substance Use Topics    Alcohol use: Not Currently     Comment: I dont drink at all unless  "socially and it’d be just a glass    Drug use: Yes     Frequency: 7.0 times per week     Types: Marijuana     Comment: Use medically, keep incredibly informed, have my certificate         Review of Systems:  Review of Systems   Constitutional: Negative.    HENT: Negative.     Eyes: Negative.    Respiratory: Negative.     Cardiovascular: Negative.    Gastrointestinal: Negative.    Endocrine: Negative.    Genitourinary: Negative.    Musculoskeletal:  Positive for arthralgias. Negative for joint swelling.   Skin: Negative.  Negative for color change and wound.   Allergic/Immunologic: Negative.    Neurological: Negative.    Hematological: Negative.    Psychiatric/Behavioral: Negative.          Physical Exam:  /78 (BP Location: Left arm, Patient Position: Sitting)   Pulse 83   Temp 98.4 °F (36.9 °C) (Oral)   Resp 16   Ht 160 cm (62.99\")   Wt 70.4 kg (155 lb 4.8 oz)   LMP 07/07/2025 (Exact Date)   SpO2 99%   BMI 27.52 kg/m²         Physical Exam  Vitals and nursing note reviewed.   Constitutional:       Appearance: Normal appearance.   HENT:      Head: Normocephalic and atraumatic.      Nose: Nose normal.      Mouth/Throat:      Mouth: Mucous membranes are moist.   Eyes:      Extraocular Movements: Extraocular movements intact.      Conjunctiva/sclera: Conjunctivae normal.      Pupils: Pupils are equal, round, and reactive to light.   Cardiovascular:      Rate and Rhythm: Normal rate and regular rhythm.      Heart sounds: Normal heart sounds.   Pulmonary:      Effort: Pulmonary effort is normal.      Breath sounds: Normal breath sounds.   Abdominal:      General: Abdomen is flat. Bowel sounds are normal.      Palpations: Abdomen is soft.   Musculoskeletal:         General: No swelling, deformity or signs of injury. Normal range of motion.      Right shoulder: Normal. Normal range of motion.      Left shoulder: Normal.      Right wrist: Normal. Normal pulse.      Cervical back: Normal range of motion and neck " supple.      Comments: States pain with crossover test.  Has full range of motion of the right shoulder   Skin:     General: Skin is warm and dry.      Capillary Refill: Capillary refill takes less than 2 seconds.   Neurological:      General: No focal deficit present.      Mental Status: She is alert and oriented to person, place, and time.   Psychiatric:         Mood and Affect: Mood normal.         Behavior: Behavior normal.                    Medical Decision Making:      Comorbidities that affect care:    Personality disorder, fibromyalgia, Hypertension    External Notes reviewed:    Previous Clinic Note: Office visit with behavioral health 6/20/2025      The following orders were placed and all results were independently analyzed by me:  Orders Placed This Encounter   Procedures    XR Shoulder 2+ View Right       Medications Given in the Emergency Department:  Medications - No data to display     ED Course:    The patient was initially evaluated in the triage area where orders were placed. The patient was later dispositioned by Vicenta Zimmerman PA-C.      The patient was advised to stay for completion of workup which includes but is not limited to communication of labs and radiological results, reassessment and plan. The patient was advised that leaving prior to disposition by a provider could result in critical findings that are not communicated to the patient.          Labs:    Lab Results (last 24 hours)       ** No results found for the last 24 hours. **             Imaging:    XR Shoulder 2+ View Right  Result Date: 7/13/2025  XR SHOULDER 2+ VW RIGHT-  Date of exam: 7/13/2025 8:45 PM.  Comparison: None available.  INDICATIONS: 24-year-old female w/ h/o injury involving the right shoulder.   FINDINGS: Five (5) views of the right shoulder were obtained. No acute fracture or acute malalignment is identified. If symptoms or clinical concerns persist, consider imaging follow-up.       No acute fracture or  acute malalignment is identified.   Portions of this note were completed with a voice recognition program.  7/13/2025 9:06 PM by Se Al MD on Workstation: [id number]          Differential Diagnosis and Discussion:      Orthopedic Injuries: Differential diagnosis includes but is not limited to fractures, soft tissue injuries, dislocations, contusions, ligamentous injuries, tendon injuries, nerve injuries, compartment syndrome, bursitis, and vascular injuries.    PROCEDURES:    X-ray were performed in the emergency department and all X-ray impressions were independently interpreted by me.    No orders to display        Procedures    MDM                   Patient Care Considerations:    NARCOTICS: I considered prescribing opiate pain medication as an outpatient, however x-ray negative for fractures or dislocations      Consultants/Shared Management Plan:    SHARED VISIT: I have discussed the case with my supervising physician, Dr Juarez who states reviewed patient's chart and x-ray. The substantive portion of the medical decision was made by the attesting physician who made or approve the management plan and will take responsibility for the patient.  Clinical findings were discussed and ultimate disposition was made in consult with supervising physician.    Social Determinants of Health:    Patient is independent, reliable, and has access to care.       Disposition and Care Coordination:    Discharged: The patient is suitable and stable for discharge with no need for consideration of admission.    I have explained the patient´s condition, diagnoses and treatment plan based on the information available to me at this time. I have answered questions and addressed any concerns. The patient has a good  understanding of the patient´s diagnosis, condition, and treatment plan as can be expected at this point. The vital signs have been stable. The patient´s condition is stable and appropriate for discharge from the  emergency department.      The patient will pursue further outpatient evaluation with the primary care physician or other designated or consulting physician as outlined in the discharge instructions. They are agreeable to this plan of care and follow-up instructions have been explained in detail. The patient has received these instructions in written format and has expressed an understanding of the discharge instructions. The patient is aware that any significant change in condition or worsening of symptoms should prompt an immediate return to this or the closest emergency department or call to 1.  I have explained discharge medications and the need for follow up with the patient/caretakers. This was also printed in the discharge instructions. Patient was discharged with the following medications and follow up:      Medication List        New Prescriptions      cyclobenzaprine 10 MG tablet  Commonly known as: FLEXERIL  Take 1 tablet by mouth 3 (Three) Times a Day As Needed for Muscle Spasms.               Where to Get Your Medications        These medications were sent to [id number] - [place], [place] - [address] AT [facility] [phone]  - [phone]   [address] [place] [address]      Phone: [phone]   cyclobenzaprine 10 MG tablet      Ellen Black, FRANCO  [address]  [address]  [facility] [id number] [phone]    Schedule an appointment as soon as possible for a visit          Final diagnoses:   Acute pain of right shoulder        ED Disposition       ED Disposition   Discharge    Condition   Stable    Comment   --               This medical record created using voice recognition software.             Vicenta Zimmerman PA-C  07/13/25 0018

## 2025-07-22 ENCOUNTER — OFFICE VISIT (OUTPATIENT)
Dept: PSYCHIATRY | Facility: CLINIC | Age: 25
End: 2025-07-22
Payer: MEDICAID

## 2025-07-22 VITALS
WEIGHT: 148.8 LBS | HEART RATE: 76 BPM | BODY MASS INDEX: 26.36 KG/M2 | DIASTOLIC BLOOD PRESSURE: 84 MMHG | SYSTOLIC BLOOD PRESSURE: 119 MMHG | HEIGHT: 63 IN

## 2025-07-22 DIAGNOSIS — F90.9 ADULT ADHD: Primary | ICD-10-CM

## 2025-07-22 DIAGNOSIS — F33.2 MDD (MAJOR DEPRESSIVE DISORDER), RECURRENT SEVERE, WITHOUT PSYCHOSIS: ICD-10-CM

## 2025-07-22 DIAGNOSIS — F43.10 COMPLEX POSTTRAUMATIC STRESS DISORDER: ICD-10-CM

## 2025-07-22 DIAGNOSIS — F33.1 MODERATE EPISODE OF RECURRENT MAJOR DEPRESSIVE DISORDER: ICD-10-CM

## 2025-07-22 DIAGNOSIS — F60.3 BORDERLINE PERSONALITY DISORDER: ICD-10-CM

## 2025-07-22 RX ORDER — KETOCONAZOLE 20 MG/G
CREAM TOPICAL
COMMUNITY
Start: 2025-07-17

## 2025-07-22 RX ORDER — DEXTROAMPHETAMINE SACCHARATE, AMPHETAMINE ASPARTATE, DEXTROAMPHETAMINE SULFATE AND AMPHETAMINE SULFATE 1.25; 1.25; 1.25; 1.25 MG/1; MG/1; MG/1; MG/1
TABLET ORAL
COMMUNITY
Start: 2025-07-17
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
## 2025-08-13 ENCOUNTER — HOSPITAL ENCOUNTER (OUTPATIENT)
Dept: MRI IMAGING | Facility: HOSPITAL | Age: 25
Discharge: HOME | End: 2025-08-13
Payer: MEDICAID

## 2025-08-13 DIAGNOSIS — S09.90XD CLOSED HEAD INJURY, SUBSEQUENT ENCOUNTER: ICD-10-CM

## 2025-08-13 DIAGNOSIS — R42 DIZZINESS: ICD-10-CM

## 2025-08-13 PROCEDURE — 70551 MRI BRAIN STEM W/O DYE: CPT

## 2025-08-19 ENCOUNTER — OFFICE VISIT (OUTPATIENT)
Dept: PSYCHIATRY | Facility: CLINIC | Age: 25
End: 2025-08-19
Payer: MEDICAID

## 2025-08-19 VITALS
OXYGEN SATURATION: 99 % | DIASTOLIC BLOOD PRESSURE: 72 MMHG | HEIGHT: 63 IN | WEIGHT: 150.8 LBS | HEART RATE: 81 BPM | BODY MASS INDEX: 26.72 KG/M2 | SYSTOLIC BLOOD PRESSURE: 131 MMHG

## 2025-08-19 DIAGNOSIS — F60.3 BORDERLINE PERSONALITY DISORDER: ICD-10-CM

## 2025-08-19 DIAGNOSIS — F43.10 COMPLEX POSTTRAUMATIC STRESS DISORDER: ICD-10-CM

## 2025-08-19 DIAGNOSIS — F90.9 ADULT ADHD: Primary | ICD-10-CM

## 2025-08-19 RX ORDER — DEXTROAMPHETAMINE SACCHARATE, AMPHETAMINE ASPARTATE, DEXTROAMPHETAMINE SULFATE AND AMPHETAMINE SULFATE 2.5; 2.5; 2.5; 2.5 MG/1; MG/1; MG/1; MG/1
10 TABLET ORAL 2 TIMES DAILY
Qty: 60 TABLET | Refills: 0 | Status: SHIPPED | OUTPATIENT
Start: 2025-08-19 | End: 2025-09-18